# Patient Record
Sex: MALE | Race: WHITE | NOT HISPANIC OR LATINO
[De-identification: names, ages, dates, MRNs, and addresses within clinical notes are randomized per-mention and may not be internally consistent; named-entity substitution may affect disease eponyms.]

---

## 2017-10-18 ENCOUNTER — HOSPITAL (OUTPATIENT)
Dept: OTHER | Age: 22
End: 2017-10-18
Attending: PLASTIC SURGERY

## 2018-07-23 PROBLEM — F31.9 BIPOLAR DISORDER (HCC): Status: ACTIVE | Noted: 2018-07-23

## 2018-07-23 PROBLEM — F41.9 ANXIETY DISORDER, UNSPECIFIED: Status: ACTIVE | Noted: 2018-07-23

## 2018-11-20 ENCOUNTER — EKG ENCOUNTER (OUTPATIENT)
Dept: LAB | Facility: HOSPITAL | Age: 23
End: 2018-11-20
Attending: Other
Payer: COMMERCIAL

## 2018-11-20 ENCOUNTER — APPOINTMENT (OUTPATIENT)
Dept: LAB | Facility: HOSPITAL | Age: 23
End: 2018-11-20
Attending: Other
Payer: COMMERCIAL

## 2018-11-20 DIAGNOSIS — E86.0 DEHYDRATION: Primary | ICD-10-CM

## 2018-11-20 PROCEDURE — 85025 COMPLETE CBC W/AUTO DIFF WBC: CPT

## 2018-11-20 PROCEDURE — 36415 COLL VENOUS BLD VENIPUNCTURE: CPT

## 2018-11-20 PROCEDURE — 93005 ELECTROCARDIOGRAM TRACING: CPT

## 2018-11-20 PROCEDURE — 84443 ASSAY THYROID STIM HORMONE: CPT

## 2018-11-20 PROCEDURE — 82150 ASSAY OF AMYLASE: CPT

## 2018-11-20 PROCEDURE — 84436 ASSAY OF TOTAL THYROXINE: CPT

## 2018-11-20 PROCEDURE — 84100 ASSAY OF PHOSPHORUS: CPT

## 2018-11-20 PROCEDURE — 80053 COMPREHEN METABOLIC PANEL: CPT

## 2018-11-20 PROCEDURE — 93010 ELECTROCARDIOGRAM REPORT: CPT | Performed by: INTERNAL MEDICINE

## 2018-11-20 PROCEDURE — 83735 ASSAY OF MAGNESIUM: CPT

## 2018-11-20 PROCEDURE — 84480 ASSAY TRIIODOTHYRONINE (T3): CPT

## 2019-05-01 PROBLEM — F50.01 ANOREXIA NERVOSA, RESTRICTING TYPE (HCC): Status: ACTIVE | Noted: 2019-05-01

## 2019-05-01 PROBLEM — F50.019 ANOREXIA NERVOSA, RESTRICTING TYPE: Status: ACTIVE | Noted: 2019-05-01

## 2019-05-01 PROBLEM — F50.01 ANOREXIA NERVOSA, RESTRICTING TYPE: Status: ACTIVE | Noted: 2019-05-01

## 2019-05-02 PROBLEM — K83.4 SPHINCTER OF ODDI DYSFUNCTION: Status: ACTIVE | Noted: 2019-05-02

## 2019-05-02 PROBLEM — F50.012 ANOREXIA NERVOSA, RESTRICTING TYPE, SEVERE: Status: ACTIVE | Noted: 2019-05-01

## 2019-05-02 PROBLEM — F50.01: Status: ACTIVE | Noted: 2019-05-01

## 2019-05-02 PROBLEM — F50.01 ANOREXIA NERVOSA, RESTRICTING TYPE, SEVERE: Status: ACTIVE | Noted: 2019-05-01

## 2019-05-02 PROBLEM — K90.0 CELIAC DISEASE (HCC): Status: ACTIVE | Noted: 2019-05-02

## 2019-05-02 PROBLEM — Z78.9 FEMALE-TO-MALE TRANSGENDER PERSON: Status: ACTIVE | Noted: 2019-05-02

## 2019-05-02 PROBLEM — K90.0 CELIAC DISEASE: Status: ACTIVE | Noted: 2019-05-02

## 2019-07-25 ENCOUNTER — HOSPITAL (OUTPATIENT)
Dept: OTHER | Age: 24
End: 2019-07-25
Attending: EMERGENCY MEDICINE

## 2019-07-25 LAB
ABS LYMPH: 1.4 K/CUMM (ref 1–3.5)
ABS MONO: 0.6 K/CUMM (ref 0.1–0.8)
ABS NEUTRO: 3.8 K/CUMM (ref 2–8)
ANION GAP SERPL CALC-SCNC: 12 MEQ/L (ref 10–20)
BASOPHIL: 0 % (ref 0–1)
BUN SERPL-MCNC: 13 MG/DL (ref 6–20)
CALCIUM: 10.1 MG/DL (ref 8.4–10.2)
CHLORIDE: 106 MEQ/L (ref 97–107)
CREATININE: 0.88 MG/DL (ref 0.6–1.3)
DEVICE SN: NORMAL
DIFF_TYPE?: NORMAL
EOSINOPHIL: 1 % (ref 0–6)
GLUCOSE LVL: 97 MG/DL (ref 70–99)
HCT VFR BLD CALC: 45 % (ref 36–51)
HEMOLYSIS 2+: NEGATIVE
HGB BLD-MCNC: 15.4 G/DL (ref 12–17)
IMMATURE GRAN: 0.5 % (ref 0–0.3)
INSTR WBC: 5.9 K/CUMM (ref 4–11)
LYMPHOCYTE: 24 %
MCH RBC QN AUTO: 32 PG (ref 25–35)
MCHC RBC AUTO-ENTMCNC: 34 G/DL (ref 32–37)
MCV RBC AUTO: 93 FL (ref 78–97)
MONOCYTE: 9 %
NEUTROPHIL: 65 %
NRBC BLD MANUAL-RTO: 0 % (ref 0–0.2)
PLATELET: 233 K/CUMM (ref 150–450)
POC_GLU: 94 MG/DL (ref 70–99)
POTASSIUM: 3.9 MEQ/L (ref 3.5–5.1)
RBC # BLD: 4.84 M/CUMM (ref 4.2–6)
RDW: 12 % (ref 11.5–14.5)
SODIUM: 140 MEQ/L (ref 136–145)
TCO2: 26 MEQ/L (ref 19–29)
TECH_ID: NORMAL
WBC # BLD: 5.9 K/CUMM (ref 4–11)

## 2019-09-04 ENCOUNTER — LAB SERVICES (OUTPATIENT)
Dept: LAB | Age: 24
End: 2019-09-04

## 2019-09-04 ENCOUNTER — OFFICE VISIT (OUTPATIENT)
Dept: OBGYN | Age: 24
End: 2019-09-04

## 2019-09-04 VITALS
SYSTOLIC BLOOD PRESSURE: 100 MMHG | WEIGHT: 141 LBS | BODY MASS INDEX: 25.95 KG/M2 | HEIGHT: 62 IN | DIASTOLIC BLOOD PRESSURE: 60 MMHG

## 2019-09-04 DIAGNOSIS — Z01.419 ENCOUNTER FOR ROUTINE GYNECOLOGICAL EXAMINATION WITH PAPANICOLAOU SMEAR OF CERVIX: Primary | ICD-10-CM

## 2019-09-04 DIAGNOSIS — Z11.3 ROUTINE SCREENING FOR STI (SEXUALLY TRANSMITTED INFECTION): ICD-10-CM

## 2019-09-04 DIAGNOSIS — Z11.51 SCREENING FOR HUMAN PAPILLOMAVIRUS (HPV): ICD-10-CM

## 2019-09-04 DIAGNOSIS — Z11.8 ENCOUNTER FOR SCREENING EXAMINATION FOR CHLAMYDIAL INFECTION: ICD-10-CM

## 2019-09-04 DIAGNOSIS — N92.6 IRREGULAR PERIODS: ICD-10-CM

## 2019-09-04 PROBLEM — F31.9 BIPOLAR DISORDER (CMD): Status: ACTIVE | Noted: 2018-07-23

## 2019-09-04 PROBLEM — Z78.9 FEMALE-TO-MALE TRANSGENDER PERSON: Status: ACTIVE | Noted: 2019-05-02

## 2019-09-04 PROBLEM — K83.4 SPHINCTER OF ODDI DYSFUNCTION: Status: ACTIVE | Noted: 2019-05-02

## 2019-09-04 PROBLEM — F50.9 EATING DISORDER: Status: ACTIVE | Noted: 2019-09-04

## 2019-09-04 PROBLEM — F41.9 ANXIETY DISORDER, UNSPECIFIED: Status: ACTIVE | Noted: 2018-07-23

## 2019-09-04 PROBLEM — K90.0 CELIAC DISEASE: Status: ACTIVE | Noted: 2019-05-02

## 2019-09-04 LAB
HBV SURFACE AG SERPL QL IA: NEGATIVE
HIV1+2 AB SERPL QL IA: ABNORMAL

## 2019-09-04 PROCEDURE — 87389 HIV-1 AG W/HIV-1&-2 AB AG IA: CPT | Performed by: NURSE PRACTITIONER

## 2019-09-04 PROCEDURE — 87491 CHLMYD TRACH DNA AMP PROBE: CPT | Performed by: NURSE PRACTITIONER

## 2019-09-04 PROCEDURE — 36415 COLL VENOUS BLD VENIPUNCTURE: CPT | Performed by: NURSE PRACTITIONER

## 2019-09-04 PROCEDURE — 87591 N.GONORRHOEAE DNA AMP PROB: CPT | Performed by: NURSE PRACTITIONER

## 2019-09-04 PROCEDURE — 86803 HEPATITIS C AB TEST: CPT | Performed by: NURSE PRACTITIONER

## 2019-09-04 PROCEDURE — 87340 HEPATITIS B SURFACE AG IA: CPT | Performed by: NURSE PRACTITIONER

## 2019-09-04 PROCEDURE — 99385 PREV VISIT NEW AGE 18-39: CPT | Performed by: NURSE PRACTITIONER

## 2019-09-04 PROCEDURE — 86592 SYPHILIS TEST NON-TREP QUAL: CPT | Performed by: NURSE PRACTITIONER

## 2019-09-04 PROCEDURE — 99203 OFFICE O/P NEW LOW 30 MIN: CPT | Performed by: NURSE PRACTITIONER

## 2019-09-04 PROCEDURE — 88142 CYTOPATH C/V THIN LAYER: CPT | Performed by: PATHOLOGY

## 2019-09-04 RX ORDER — CLONAZEPAM 1 MG/1
1 TABLET ORAL
COMMUNITY
Start: 2019-07-10 | End: 2019-10-08

## 2019-09-04 RX ORDER — LAMOTRIGINE 150 MG/1
300 TABLET ORAL
COMMUNITY
Start: 2019-08-21 | End: 2019-11-19

## 2019-09-04 RX ORDER — ASENAPINE 10 MG/1
10 TABLET SUBLINGUAL
COMMUNITY
Start: 2019-08-21 | End: 2019-09-20

## 2019-09-04 SDOH — HEALTH STABILITY: MENTAL HEALTH: HOW OFTEN DO YOU HAVE A DRINK CONTAINING ALCOHOL?: NEVER

## 2019-09-05 DIAGNOSIS — R75 INDETERMINATE HIV RESULT: Primary | ICD-10-CM

## 2019-09-05 LAB
C TRACH DNA SPEC QL NAA+PROBE: NEGATIVE
HCV AB SER QL: NEGATIVE
HIV 1+2 AB+HIV1 P24 AG SERPL QL IA: NONREACTIVE
N GONORRHOEA DNA SPEC QL NAA+PROBE: NEGATIVE

## 2019-09-08 LAB — RPR SER QL: NORMAL

## 2019-09-09 LAB — AP REPORT: NORMAL

## 2019-09-17 ENCOUNTER — OFFICE VISIT (OUTPATIENT)
Dept: OBGYN | Age: 24
End: 2019-09-17

## 2019-09-17 ENCOUNTER — IMAGING SERVICES (OUTPATIENT)
Dept: ULTRASOUND IMAGING | Age: 24
End: 2019-09-17
Attending: NURSE PRACTITIONER

## 2019-09-17 VITALS
SYSTOLIC BLOOD PRESSURE: 102 MMHG | BODY MASS INDEX: 26.57 KG/M2 | WEIGHT: 145.25 LBS | DIASTOLIC BLOOD PRESSURE: 82 MMHG

## 2019-09-17 DIAGNOSIS — N92.6 IRREGULAR PERIODS: ICD-10-CM

## 2019-09-17 DIAGNOSIS — N92.6 IRREGULAR MENSES: Primary | ICD-10-CM

## 2019-09-17 PROCEDURE — 99214 OFFICE O/P EST MOD 30 MIN: CPT | Performed by: OBSTETRICS & GYNECOLOGY

## 2019-09-17 PROCEDURE — 76856 US EXAM PELVIC COMPLETE: CPT | Performed by: RADIOLOGY

## 2019-09-17 PROCEDURE — 76830 TRANSVAGINAL US NON-OB: CPT | Performed by: RADIOLOGY

## 2019-09-17 RX ORDER — TESTOSTERONE CYPIONATE 200 MG/ML
INJECTION, SOLUTION INTRAMUSCULAR
Refills: 0 | COMMUNITY
Start: 2019-08-27

## 2019-09-17 RX ORDER — TEMAZEPAM 15 MG/1
15 CAPSULE ORAL
COMMUNITY
Start: 2019-09-04 | End: 2019-10-04

## 2019-09-17 RX ORDER — TEMAZEPAM 15 MG/1
CAPSULE ORAL
Refills: 0 | COMMUNITY
Start: 2019-09-04 | End: 2021-01-24 | Stop reason: ALTCHOICE

## 2019-09-18 ENCOUNTER — WORKER'S COMP (OUTPATIENT)
Dept: OCCUPATIONAL MEDICINE | Age: 24
End: 2019-09-18

## 2019-09-18 DIAGNOSIS — S61.011A LACERATION OF RIGHT THUMB WITHOUT FOREIGN BODY WITHOUT DAMAGE TO NAIL, INITIAL ENCOUNTER: ICD-10-CM

## 2019-09-18 DIAGNOSIS — Z02.71 ISSUE OF MEDICAL CERTIFICATE FOR DISABILITY EXAMINATION: Primary | ICD-10-CM

## 2019-09-18 PROCEDURE — 99203 OFFICE O/P NEW LOW 30 MIN: CPT | Performed by: FAMILY MEDICINE

## 2019-09-18 PROCEDURE — 90714 TD VACC NO PRESV 7 YRS+ IM: CPT

## 2019-09-18 PROCEDURE — 90471 IMMUNIZATION ADMIN: CPT

## 2019-09-19 ENCOUNTER — TELEPHONE (OUTPATIENT)
Dept: OBGYN | Age: 24
End: 2019-09-19

## 2019-09-19 DIAGNOSIS — R10.2 PELVIC PAIN: Primary | ICD-10-CM

## 2019-10-12 ENCOUNTER — WALK IN (OUTPATIENT)
Dept: URGENT CARE | Age: 24
End: 2019-10-12

## 2019-10-12 VITALS
HEART RATE: 74 BPM | OXYGEN SATURATION: 98 % | RESPIRATION RATE: 16 BRPM | DIASTOLIC BLOOD PRESSURE: 70 MMHG | SYSTOLIC BLOOD PRESSURE: 118 MMHG | TEMPERATURE: 98.2 F

## 2019-10-12 DIAGNOSIS — J40 BRONCHITIS: ICD-10-CM

## 2019-10-12 DIAGNOSIS — J32.9 SINUSITIS, UNSPECIFIED CHRONICITY, UNSPECIFIED LOCATION: Primary | ICD-10-CM

## 2019-10-12 PROCEDURE — 99204 OFFICE O/P NEW MOD 45 MIN: CPT | Performed by: FAMILY MEDICINE

## 2019-10-12 RX ORDER — ASENAPINE MALEATE 10 MG/1
TABLET SUBLINGUAL
Refills: 0 | COMMUNITY
Start: 2019-09-20 | End: 2021-01-24 | Stop reason: ALTCHOICE

## 2019-10-12 RX ORDER — PREDNISONE 50 MG/1
50 TABLET ORAL DAILY
Qty: 5 TABLET | Refills: 0 | Status: SHIPPED | OUTPATIENT
Start: 2019-10-12 | End: 2019-10-17

## 2019-10-12 RX ORDER — ALBUTEROL SULFATE 90 UG/1
AEROSOL, METERED RESPIRATORY (INHALATION)
Qty: 1 INHALER | Refills: 0 | Status: SHIPPED | OUTPATIENT
Start: 2019-10-12 | End: 2021-01-24 | Stop reason: ALTCHOICE

## 2019-10-12 RX ORDER — AMOXICILLIN 875 MG/1
875 TABLET, COATED ORAL 2 TIMES DAILY
Qty: 20 TABLET | Refills: 0 | Status: SHIPPED | OUTPATIENT
Start: 2019-10-12 | End: 2019-10-22

## 2019-10-12 ASSESSMENT — ENCOUNTER SYMPTOMS
COUGH: 1
SORE THROAT: 1

## 2019-12-03 ENCOUNTER — OFFICE VISIT (OUTPATIENT)
Dept: OBGYN | Age: 24
End: 2019-12-03

## 2019-12-03 VITALS
WEIGHT: 135 LBS | BODY MASS INDEX: 24.84 KG/M2 | DIASTOLIC BLOOD PRESSURE: 72 MMHG | SYSTOLIC BLOOD PRESSURE: 118 MMHG | HEIGHT: 62 IN

## 2019-12-03 DIAGNOSIS — R10.2 PELVIC PAIN: Primary | ICD-10-CM

## 2019-12-03 PROCEDURE — 99213 OFFICE O/P EST LOW 20 MIN: CPT | Performed by: OBSTETRICS & GYNECOLOGY

## 2019-12-03 RX ORDER — LAMOTRIGINE 150 MG/1
300 TABLET ORAL
COMMUNITY
Start: 2019-10-30 | End: 2020-01-28

## 2019-12-03 RX ORDER — NICOTINE 21 MG/24HR
PATCH, TRANSDERMAL 24 HOURS TRANSDERMAL
Refills: 2 | COMMUNITY
Start: 2019-09-03 | End: 2022-10-05 | Stop reason: ALTCHOICE

## 2019-12-03 RX ORDER — CLONAZEPAM 1 MG/1
TABLET ORAL
Refills: 2 | COMMUNITY
Start: 2019-11-22 | End: 2021-01-24 | Stop reason: ALTCHOICE

## 2019-12-03 RX ORDER — BUPROPION HYDROCHLORIDE 75 MG/1
75 TABLET ORAL
COMMUNITY
Start: 2019-10-30 | End: 2020-01-28

## 2019-12-08 ENCOUNTER — WORKER'S COMP (OUTPATIENT)
Dept: URGENT CARE | Age: 24
End: 2019-12-08

## 2019-12-08 DIAGNOSIS — M25.511 ACUTE PAIN OF RIGHT SHOULDER: ICD-10-CM

## 2019-12-08 PROCEDURE — A4565 SLINGS: HCPCS | Performed by: FAMILY MEDICINE

## 2019-12-08 PROCEDURE — 99214 OFFICE O/P EST MOD 30 MIN: CPT | Performed by: FAMILY MEDICINE

## 2019-12-08 RX ORDER — TRAZODONE HYDROCHLORIDE 50 MG/1
TABLET ORAL
Refills: 0 | COMMUNITY
Start: 2019-12-04 | End: 2021-01-24 | Stop reason: ALTCHOICE

## 2019-12-08 RX ORDER — NALTREXONE HYDROCHLORIDE 50 MG/1
TABLET, FILM COATED ORAL
Refills: 0 | COMMUNITY
Start: 2019-10-30 | End: 2021-01-24 | Stop reason: ALTCHOICE

## 2019-12-08 RX ORDER — TRAMADOL HYDROCHLORIDE 50 MG/1
50 TABLET ORAL EVERY 6 HOURS PRN
Qty: 20 TABLET | Refills: 0 | Status: SHIPPED | OUTPATIENT
Start: 2019-12-08 | End: 2019-12-08 | Stop reason: CLARIF

## 2019-12-08 ASSESSMENT — PAIN SCALES - GENERAL: PAINLEVEL: 7-8

## 2019-12-09 ENCOUNTER — TELEPHONE (OUTPATIENT)
Dept: OBGYN | Age: 24
End: 2019-12-09

## 2019-12-09 ENCOUNTER — IMAGING SERVICES (OUTPATIENT)
Dept: GENERAL RADIOLOGY | Age: 24
End: 2019-12-09
Attending: PHYSICIAN ASSISTANT

## 2019-12-09 ENCOUNTER — WORKER'S COMP (OUTPATIENT)
Dept: OCCUPATIONAL MEDICINE | Age: 24
End: 2019-12-09

## 2019-12-09 DIAGNOSIS — S46.911A STRAIN OF RIGHT SHOULDER, INITIAL ENCOUNTER: Primary | ICD-10-CM

## 2019-12-09 DIAGNOSIS — Z02.71 ISSUE OF MEDICAL CERTIFICATE FOR DISABILITY EXAMINATION: ICD-10-CM

## 2019-12-09 DIAGNOSIS — S46.911A STRAIN OF RIGHT SHOULDER, INITIAL ENCOUNTER: ICD-10-CM

## 2019-12-09 PROCEDURE — 99213 OFFICE O/P EST LOW 20 MIN: CPT | Performed by: PHYSICIAN ASSISTANT

## 2019-12-09 PROCEDURE — 73030 X-RAY EXAM OF SHOULDER: CPT | Performed by: RADIOLOGY

## 2019-12-11 ENCOUNTER — MED INFO FORMS (OUTPATIENT)
Dept: HEALTH INFORMATION MANAGEMENT | Facility: OTHER | Age: 24
End: 2019-12-11

## 2019-12-16 ENCOUNTER — TELEPHONE (OUTPATIENT)
Dept: OBGYN | Age: 24
End: 2019-12-16

## 2019-12-16 ENCOUNTER — EXTERNAL RECORD (OUTPATIENT)
Dept: HEALTH INFORMATION MANAGEMENT | Facility: OTHER | Age: 24
End: 2019-12-16

## 2019-12-16 PROCEDURE — 58571 TLH W/T/O 250 G OR LESS: CPT | Performed by: OBSTETRICS & GYNECOLOGY

## 2019-12-16 PROCEDURE — 58100 BIOPSY OF UTERUS LINING: CPT | Performed by: OBSTETRICS & GYNECOLOGY

## 2019-12-16 RX ORDER — PROMETHAZINE HYDROCHLORIDE 25 MG/1
25 TABLET ORAL EVERY 6 HOURS PRN
Qty: 30 TABLET | Refills: 0 | Status: SHIPPED | OUTPATIENT
Start: 2019-12-16 | End: 2021-01-24 | Stop reason: ALTCHOICE

## 2019-12-17 ENCOUNTER — TELEPHONE (OUTPATIENT)
Dept: OBGYN | Age: 24
End: 2019-12-17

## 2019-12-23 ENCOUNTER — WORKER'S COMP (OUTPATIENT)
Dept: OCCUPATIONAL MEDICINE | Age: 24
End: 2019-12-23

## 2019-12-23 VITALS — SYSTOLIC BLOOD PRESSURE: 126 MMHG | HEART RATE: 125 BPM | TEMPERATURE: 98.4 F | DIASTOLIC BLOOD PRESSURE: 84 MMHG

## 2019-12-23 DIAGNOSIS — Z02.71 ISSUE OF MEDICAL CERTIFICATE FOR DISABILITY EXAMINATION: Primary | ICD-10-CM

## 2019-12-23 DIAGNOSIS — S46.911D STRAIN OF RIGHT SHOULDER, SUBSEQUENT ENCOUNTER: ICD-10-CM

## 2019-12-23 PROCEDURE — 99213 OFFICE O/P EST LOW 20 MIN: CPT | Performed by: FAMILY MEDICINE

## 2019-12-27 ENCOUNTER — TELEPHONE (OUTPATIENT)
Dept: OBGYN | Age: 24
End: 2019-12-27

## 2019-12-31 ENCOUNTER — OFFICE VISIT (OUTPATIENT)
Dept: OBGYN | Age: 24
End: 2019-12-31

## 2019-12-31 VITALS
WEIGHT: 134 LBS | SYSTOLIC BLOOD PRESSURE: 120 MMHG | DIASTOLIC BLOOD PRESSURE: 84 MMHG | BODY MASS INDEX: 24.51 KG/M2 | TEMPERATURE: 98.1 F

## 2019-12-31 DIAGNOSIS — R10.2 PELVIC PAIN: Primary | ICD-10-CM

## 2019-12-31 PROCEDURE — 99024 POSTOP FOLLOW-UP VISIT: CPT | Performed by: OBSTETRICS & GYNECOLOGY

## 2019-12-31 RX ORDER — CEPHALEXIN 500 MG/1
500 CAPSULE ORAL 2 TIMES DAILY
Qty: 14 CAPSULE | Refills: 0 | Status: SHIPPED | OUTPATIENT
Start: 2019-12-31 | End: 2020-01-02 | Stop reason: SDUPTHER

## 2019-12-31 RX ORDER — TRAMADOL HYDROCHLORIDE 50 MG/1
TABLET ORAL
Refills: 0 | COMMUNITY
Start: 2019-12-17 | End: 2020-01-02 | Stop reason: SDUPTHER

## 2020-01-02 ENCOUNTER — OFFICE VISIT (OUTPATIENT)
Dept: OBGYN | Age: 25
End: 2020-01-02

## 2020-01-02 ENCOUNTER — APPOINTMENT (OUTPATIENT)
Dept: OBGYN | Age: 25
End: 2020-01-02

## 2020-01-02 ENCOUNTER — TELEPHONE (OUTPATIENT)
Dept: OBGYN | Age: 25
End: 2020-01-02

## 2020-01-02 VITALS
DIASTOLIC BLOOD PRESSURE: 66 MMHG | SYSTOLIC BLOOD PRESSURE: 124 MMHG | WEIGHT: 132.25 LBS | TEMPERATURE: 100.1 F | BODY MASS INDEX: 24.19 KG/M2

## 2020-01-02 DIAGNOSIS — T81.40XD POSTOPERATIVE INFECTION, UNSPECIFIED TYPE, SUBSEQUENT ENCOUNTER: Primary | ICD-10-CM

## 2020-01-02 PROCEDURE — 99024 POSTOP FOLLOW-UP VISIT: CPT | Performed by: OBSTETRICS & GYNECOLOGY

## 2020-01-02 RX ORDER — TRAMADOL HYDROCHLORIDE 50 MG/1
50 TABLET ORAL EVERY 6 HOURS PRN
Qty: 30 TABLET | Refills: 0 | Status: SHIPPED | OUTPATIENT
Start: 2020-01-02 | End: 2021-01-24 | Stop reason: ALTCHOICE

## 2020-01-02 RX ORDER — CEPHALEXIN 500 MG/1
500 CAPSULE ORAL 4 TIMES DAILY
Qty: 28 CAPSULE | Refills: 0 | Status: SHIPPED | OUTPATIENT
Start: 2020-01-02 | End: 2020-01-09

## 2020-01-09 ENCOUNTER — TELEPHONE (OUTPATIENT)
Dept: OBGYN | Age: 25
End: 2020-01-09

## 2020-01-15 ENCOUNTER — OFFICE VISIT (OUTPATIENT)
Dept: OBGYN | Age: 25
End: 2020-01-15

## 2020-01-15 VITALS
WEIGHT: 133.25 LBS | SYSTOLIC BLOOD PRESSURE: 114 MMHG | BODY MASS INDEX: 24.37 KG/M2 | DIASTOLIC BLOOD PRESSURE: 80 MMHG

## 2020-01-15 DIAGNOSIS — R10.2 PELVIC PAIN: Primary | ICD-10-CM

## 2020-01-15 PROCEDURE — 99024 POSTOP FOLLOW-UP VISIT: CPT | Performed by: OBSTETRICS & GYNECOLOGY

## 2020-01-30 ENCOUNTER — OFFICE VISIT (OUTPATIENT)
Dept: OBGYN | Age: 25
End: 2020-01-30

## 2020-01-30 VITALS
BODY MASS INDEX: 24.42 KG/M2 | TEMPERATURE: 98.7 F | DIASTOLIC BLOOD PRESSURE: 86 MMHG | SYSTOLIC BLOOD PRESSURE: 132 MMHG | WEIGHT: 133.5 LBS

## 2020-01-30 DIAGNOSIS — R10.2 PELVIC PAIN: Primary | ICD-10-CM

## 2020-01-30 PROCEDURE — 99024 POSTOP FOLLOW-UP VISIT: CPT | Performed by: OBSTETRICS & GYNECOLOGY

## 2020-02-14 ENCOUNTER — WALK IN (OUTPATIENT)
Dept: URGENT CARE | Age: 25
End: 2020-02-14

## 2020-02-14 VITALS
TEMPERATURE: 98.5 F | DIASTOLIC BLOOD PRESSURE: 70 MMHG | RESPIRATION RATE: 20 BRPM | OXYGEN SATURATION: 98 % | SYSTOLIC BLOOD PRESSURE: 118 MMHG | HEART RATE: 110 BPM

## 2020-02-14 DIAGNOSIS — R50.9 FEVER, UNSPECIFIED FEVER CAUSE: ICD-10-CM

## 2020-02-14 DIAGNOSIS — R11.0 NAUSEA: Primary | ICD-10-CM

## 2020-02-14 DIAGNOSIS — K52.9 GASTROENTERITIS: ICD-10-CM

## 2020-02-14 LAB
FLUAV AG UPPER RESP QL IA.RAPID: NEGATIVE
FLUBV AG UPPER RESP QL IA.RAPID: NEGATIVE

## 2020-02-14 PROCEDURE — 87804 INFLUENZA ASSAY W/OPTIC: CPT | Performed by: FAMILY MEDICINE

## 2020-02-14 PROCEDURE — 99214 OFFICE O/P EST MOD 30 MIN: CPT | Performed by: FAMILY MEDICINE

## 2020-02-14 RX ORDER — BUPROPION HYDROCHLORIDE 75 MG/1
75 TABLET ORAL
COMMUNITY
Start: 2020-01-15 | End: 2020-04-14

## 2020-02-14 RX ORDER — DIAZEPAM 5 MG/1
TABLET ORAL
Refills: 0 | COMMUNITY
Start: 2020-01-15 | End: 2022-10-05 | Stop reason: ALTCHOICE

## 2020-02-14 RX ORDER — DIPHENOXYLATE HYDROCHLORIDE AND ATROPINE SULFATE 2.5; .025 MG/1; MG/1
1 TABLET ORAL 4 TIMES DAILY PRN
Qty: 20 TABLET | Refills: 0 | Status: SHIPPED | OUTPATIENT
Start: 2020-02-14 | End: 2021-01-24 | Stop reason: ALTCHOICE

## 2020-02-14 RX ORDER — LAMOTRIGINE 150 MG/1
300 TABLET ORAL
COMMUNITY
Start: 2020-01-15 | End: 2020-04-14

## 2020-02-14 RX ORDER — ONDANSETRON HYDROCHLORIDE 8 MG/1
8 TABLET, FILM COATED ORAL EVERY 8 HOURS PRN
Qty: 10 TABLET | Refills: 0 | Status: SHIPPED | OUTPATIENT
Start: 2020-02-14 | End: 2020-02-19

## 2020-03-20 ENCOUNTER — HOSPITAL ENCOUNTER (EMERGENCY)
Facility: HOSPITAL | Age: 25
Discharge: HOME | End: 2020-03-20
Attending: EMERGENCY MEDICINE

## 2020-03-20 VITALS
OXYGEN SATURATION: 99 % | BODY MASS INDEX: 24.84 KG/M2 | HEIGHT: 62 IN | TEMPERATURE: 99 F | WEIGHT: 135 LBS | RESPIRATION RATE: 15 BRPM | HEART RATE: 107 BPM

## 2020-03-20 DIAGNOSIS — G40.909 SEIZURE DISORDER (HCC): ICD-10-CM

## 2020-03-20 DIAGNOSIS — J06.9 VIRAL UPPER RESPIRATORY INFECTION: Primary | ICD-10-CM

## 2020-03-20 LAB
ALBUMIN SERPL-MCNC: 4 G/DL (ref 3.4–5)
ALBUMIN/GLOB SERPL: 1.4 {RATIO} (ref 1–2)
ALP LIVER SERPL-CCNC: 56 U/L (ref 45–117)
ALT SERPL-CCNC: 23 U/L (ref 16–61)
ANION GAP SERPL CALC-SCNC: 4 MMOL/L (ref 0–18)
AST SERPL-CCNC: 21 U/L (ref 15–37)
BASOPHILS # BLD AUTO: 0.04 X10(3) UL (ref 0–0.2)
BASOPHILS NFR BLD AUTO: 1 %
BILIRUB SERPL-MCNC: 0.8 MG/DL (ref 0.1–2)
BUN BLD-MCNC: 10 MG/DL (ref 7–18)
BUN/CREAT SERPL: 11.4 (ref 10–20)
CALCIUM BLD-MCNC: 9.2 MG/DL (ref 8.5–10.1)
CHLORIDE SERPL-SCNC: 111 MMOL/L (ref 98–112)
CO2 SERPL-SCNC: 25 MMOL/L (ref 21–32)
CREAT BLD-MCNC: 0.88 MG/DL (ref 0.7–1.3)
DEPRECATED RDW RBC AUTO: 37.7 FL (ref 35.1–46.3)
EOSINOPHIL # BLD AUTO: 0.08 X10(3) UL (ref 0–0.7)
EOSINOPHIL NFR BLD AUTO: 2 %
ERYTHROCYTE [DISTWIDTH] IN BLOOD BY AUTOMATED COUNT: 11.7 % (ref 11–15)
FLUAV + FLUBV RNA SPEC NAA+PROBE: NEGATIVE
GLOBULIN PLAS-MCNC: 2.9 G/DL (ref 2.8–4.4)
GLUCOSE BLD-MCNC: 127 MG/DL (ref 70–99)
GLUCOSE BLD-MCNC: 90 MG/DL (ref 70–99)
HCT VFR BLD AUTO: 44.3 % (ref 39–53)
HGB BLD-MCNC: 15.2 G/DL (ref 13–17.5)
IMM GRANULOCYTES # BLD AUTO: 0.01 X10(3) UL (ref 0–1)
IMM GRANULOCYTES NFR BLD: 0.2 %
LYMPHOCYTES # BLD AUTO: 1.76 X10(3) UL (ref 1–4)
LYMPHOCYTES NFR BLD AUTO: 43.1 %
M PROTEIN MFR SERPL ELPH: 6.9 G/DL (ref 6.4–8.2)
MCH RBC QN AUTO: 30.4 PG (ref 26–34)
MCHC RBC AUTO-ENTMCNC: 34.3 G/DL (ref 31–37)
MCV RBC AUTO: 88.6 FL (ref 80–100)
MONOCYTES # BLD AUTO: 0.37 X10(3) UL (ref 0.1–1)
MONOCYTES NFR BLD AUTO: 9.1 %
NEUTROPHILS # BLD AUTO: 1.82 X10 (3) UL (ref 1.5–7.7)
NEUTROPHILS # BLD AUTO: 1.82 X10(3) UL (ref 1.5–7.7)
NEUTROPHILS NFR BLD AUTO: 44.6 %
OSMOLALITY SERPL CALC.SUM OF ELEC: 289 MOSM/KG (ref 275–295)
PLATELET # BLD AUTO: 231 10(3)UL (ref 150–450)
POTASSIUM SERPL-SCNC: 3.5 MMOL/L (ref 3.5–5.1)
RBC # BLD AUTO: 5 X10(6)UL (ref 4.3–5.7)
SODIUM SERPL-SCNC: 140 MMOL/L (ref 136–145)
WBC # BLD AUTO: 4.1 X10(3) UL (ref 4–11)

## 2020-03-20 PROCEDURE — 87999 UNLISTED MICROBIOLOGY PX: CPT

## 2020-03-20 PROCEDURE — 93005 ELECTROCARDIOGRAM TRACING: CPT

## 2020-03-20 PROCEDURE — 96366 THER/PROPH/DIAG IV INF ADDON: CPT

## 2020-03-20 PROCEDURE — 99284 EMERGENCY DEPT VISIT MOD MDM: CPT

## 2020-03-20 PROCEDURE — 96365 THER/PROPH/DIAG IV INF INIT: CPT

## 2020-03-20 PROCEDURE — 93010 ELECTROCARDIOGRAM REPORT: CPT

## 2020-03-20 PROCEDURE — 87798 DETECT AGENT NOS DNA AMP: CPT | Performed by: EMERGENCY MEDICINE

## 2020-03-20 PROCEDURE — 87502 INFLUENZA DNA AMP PROBE: CPT | Performed by: EMERGENCY MEDICINE

## 2020-03-20 PROCEDURE — 80053 COMPREHEN METABOLIC PANEL: CPT | Performed by: EMERGENCY MEDICINE

## 2020-03-20 PROCEDURE — 85025 COMPLETE CBC W/AUTO DIFF WBC: CPT | Performed by: EMERGENCY MEDICINE

## 2020-03-20 PROCEDURE — 82962 GLUCOSE BLOOD TEST: CPT

## 2020-03-20 RX ORDER — LAMOTRIGINE 200 MG/1
100 TABLET ORAL 2 TIMES DAILY
Qty: 60 TABLET | Refills: 0 | Status: SHIPPED | OUTPATIENT
Start: 2020-03-20 | End: 2020-03-20

## 2020-03-20 RX ORDER — LAMOTRIGINE 150 MG/1
150 TABLET ORAL 2 TIMES DAILY
Qty: 60 TABLET | Refills: 0 | Status: SHIPPED | OUTPATIENT
Start: 2020-03-20 | End: 2020-04-19

## 2020-03-20 NOTE — ED INITIAL ASSESSMENT (HPI)
Pt here after having witnessed seizure at work. Pt does not remember event. Pt notes running out medications and not taking Lamictal. Pt notes that he has also had cough and intermittent chills for last dew days.  Pt notes that his head hearts but unsure it

## 2020-03-20 NOTE — ED PROVIDER NOTES
Patient Seen in: [facility] Emergency Department      History   Patient presents with:  Seizure Disorder  Cough/URI    Stated Complaint: seizure/cough    HPI    28-year-old male presents emergency department who states after having witnessed seizure 5 days/week             Review of Systems    Positive for stated complaint: seizure/cough  Other systems are as noted in HPI. Constitutional and vital signs reviewed. All other systems reviewed and negative except as noted above.     Physical Exam view results for these tests on the individual orders. CBC W/ DIFFERENTIAL     EKG    Rate, intervals and axes as noted on EKG Report.   Rate: 109  Rhythm: Sinus Rhythm  Reading: Sinus tachycardia              Patient was evaluated and will be given a Yvan

## 2020-03-21 LAB
ATRIAL RATE: 109 BPM
P AXIS: 59 DEGREES
P-R INTERVAL: 144 MS
Q-T INTERVAL: 336 MS
QRS DURATION: 68 MS
QTC CALCULATION (BEZET): 452 MS
R AXIS: 72 DEGREES
T AXIS: 49 DEGREES
VENTRICULAR RATE: 109 BPM

## 2021-01-24 ENCOUNTER — WALK IN (OUTPATIENT)
Dept: URGENT CARE | Age: 26
End: 2021-01-24

## 2021-01-24 VITALS
DIASTOLIC BLOOD PRESSURE: 66 MMHG | HEART RATE: 86 BPM | SYSTOLIC BLOOD PRESSURE: 118 MMHG | OXYGEN SATURATION: 100 % | RESPIRATION RATE: 18 BRPM | TEMPERATURE: 98.8 F

## 2021-01-24 DIAGNOSIS — N39.0 URINARY TRACT INFECTION WITHOUT HEMATURIA, SITE UNSPECIFIED: ICD-10-CM

## 2021-01-24 DIAGNOSIS — Z20.822 SUSPECTED COVID-19 VIRUS INFECTION: Primary | ICD-10-CM

## 2021-01-24 DIAGNOSIS — R30.0 DYSURIA: ICD-10-CM

## 2021-01-24 DIAGNOSIS — Z20.822 CONTACT WITH AND (SUSPECTED) EXPOSURE TO COVID-19: ICD-10-CM

## 2021-01-24 LAB
BILIRUBIN URINE: NEGATIVE
BLOOD URINE: NEGATIVE
CLARITY: CLEAR
COLOR: NORMAL
GLUCOSE QUALITATIVE U: NEGATIVE
KETONES, URINE: NEGATIVE
LEUKOCYTE ESTERASE URINE: NEGATIVE
NITRITE URINE: NEGATIVE
PH URINE: 7 (ref 5–7)
SARS-COV-2 AG RESP QL IA.RAPID: NOT DETECTED
SPECIFIC GRAVITY URINE: 1 (ref 1–1.03)
URINE PROTEIN, QUAL (DIPSTICK): NEGATIVE
UROBILINOGEN URINE: <2

## 2021-01-24 PROCEDURE — 81003 URINALYSIS AUTO W/O SCOPE: CPT | Performed by: FAMILY MEDICINE

## 2021-01-24 PROCEDURE — 87086 URINE CULTURE/COLONY COUNT: CPT | Performed by: FAMILY MEDICINE

## 2021-01-24 PROCEDURE — 87426 SARSCOV CORONAVIRUS AG IA: CPT | Performed by: FAMILY MEDICINE

## 2021-01-24 PROCEDURE — 99214 OFFICE O/P EST MOD 30 MIN: CPT | Performed by: FAMILY MEDICINE

## 2021-01-24 RX ORDER — CEFUROXIME AXETIL 500 MG/1
500 TABLET ORAL 2 TIMES DAILY
Qty: 20 TABLET | Refills: 0 | Status: SHIPPED | OUTPATIENT
Start: 2021-01-24 | End: 2021-02-03

## 2021-01-24 RX ORDER — ARIPIPRAZOLE 5 MG/1
TABLET ORAL
COMMUNITY
Start: 2020-11-25 | End: 2022-10-05 | Stop reason: ALTCHOICE

## 2021-01-24 RX ORDER — LAMOTRIGINE 150 MG/1
150 TABLET ORAL
COMMUNITY
Start: 2020-07-20

## 2021-01-24 ASSESSMENT — ENCOUNTER SYMPTOMS
COUGH: 1
SORE THROAT: 1
HEADACHES: 1

## 2021-01-25 LAB — FINAL REPORT: NORMAL

## 2021-04-08 ENCOUNTER — WALK IN (OUTPATIENT)
Dept: URGENT CARE | Age: 26
End: 2021-04-08

## 2021-04-08 DIAGNOSIS — B34.9 VIRAL SYNDROME: Primary | ICD-10-CM

## 2021-04-08 DIAGNOSIS — J02.9 SORE THROAT: ICD-10-CM

## 2021-04-08 DIAGNOSIS — Z20.822 SUSPECTED COVID-19 VIRUS INFECTION: ICD-10-CM

## 2021-04-08 LAB
INTERNAL PROCEDURAL CONTROLS ACCEPTABLE: YES
S PYO AG THROAT QL IA.RAPID: NEGATIVE
SARS-COV+SARS-COV-2 AG RESP QL IA.RAPID: NOT DETECTED

## 2021-04-08 PROCEDURE — 87880 STREP A ASSAY W/OPTIC: CPT | Performed by: FAMILY MEDICINE

## 2021-04-08 PROCEDURE — 87426 SARSCOV CORONAVIRUS AG IA: CPT | Performed by: FAMILY MEDICINE

## 2021-04-08 PROCEDURE — 99213 OFFICE O/P EST LOW 20 MIN: CPT | Performed by: FAMILY MEDICINE

## 2021-04-08 SDOH — HEALTH STABILITY: MENTAL HEALTH: HOW OFTEN DO YOU HAVE A DRINK CONTAINING ALCOHOL?: NEVER

## 2021-04-08 ASSESSMENT — PAIN SCALES - GENERAL: PAINLEVEL: 5-6

## 2021-05-26 VITALS
DIASTOLIC BLOOD PRESSURE: 70 MMHG | SYSTOLIC BLOOD PRESSURE: 110 MMHG | SYSTOLIC BLOOD PRESSURE: 110 MMHG | HEART RATE: 85 BPM | OXYGEN SATURATION: 100 % | RESPIRATION RATE: 16 BRPM | HEART RATE: 77 BPM | DIASTOLIC BLOOD PRESSURE: 76 MMHG | TEMPERATURE: 98.5 F | TEMPERATURE: 98.5 F | OXYGEN SATURATION: 98 % | RESPIRATION RATE: 18 BRPM

## 2021-06-27 ENCOUNTER — IMAGING SERVICES (OUTPATIENT)
Dept: GENERAL RADIOLOGY | Age: 26
End: 2021-06-27
Attending: FAMILY MEDICINE

## 2021-06-27 ENCOUNTER — WALK IN (OUTPATIENT)
Dept: URGENT CARE | Age: 26
End: 2021-06-27

## 2021-06-27 VITALS
SYSTOLIC BLOOD PRESSURE: 110 MMHG | OXYGEN SATURATION: 100 % | HEART RATE: 80 BPM | RESPIRATION RATE: 18 BRPM | DIASTOLIC BLOOD PRESSURE: 76 MMHG | TEMPERATURE: 97.9 F

## 2021-06-27 DIAGNOSIS — S20.229A CONTUSION OF BACK, UNSPECIFIED LATERALITY, INITIAL ENCOUNTER: ICD-10-CM

## 2021-06-27 DIAGNOSIS — S20.229A CONTUSION OF BACK, UNSPECIFIED LATERALITY, INITIAL ENCOUNTER: Primary | ICD-10-CM
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
## 2021-06-27 DIAGNOSIS — S39.92XA INJURY OF LOW BACK, INITIAL ENCOUNTER: ICD-10-CM

## 2021-06-27 LAB
APPEARANCE, POC: CLEAR
BILIRUBIN, POC: NEGATIVE
COLOR, POC: YELLOW
GLUCOSE UR-MCNC: NEGATIVE MG/DL
KETONES, POC: NEGATIVE MG/DL
NITRITE, POC: NEGATIVE
OCCULT BLOOD, POC: NEGATIVE
PH UR: 7.5 [PH] (ref 5–7)
PROT UR-MCNC: NEGATIVE MG/DL
SP GR UR: 1.02 (ref 1–1.03)
UROBILINOGEN UR-MCNC: 0.2 MG/DL (ref 0–1)
WBC (LEUKOCYTE) ESTERASE, POC: ABNORMAL

## 2021-06-27 PROCEDURE — 81002 URINALYSIS NONAUTO W/O SCOPE: CPT | Performed by: FAMILY MEDICINE

## 2021-06-27 PROCEDURE — 87186 SC STD MICRODIL/AGAR DIL: CPT | Performed by: FAMILY MEDICINE

## 2021-06-27 PROCEDURE — 87088 URINE BACTERIA CULTURE: CPT | Performed by: FAMILY MEDICINE

## 2021-06-27 PROCEDURE — 72110 X-RAY EXAM L-2 SPINE 4/>VWS: CPT | Performed by: RADIOLOGY

## 2021-06-27 PROCEDURE — 81015 MICROSCOPIC EXAM OF URINE: CPT | Performed by: FAMILY MEDICINE

## 2021-06-27 PROCEDURE — 87086 URINE CULTURE/COLONY COUNT: CPT | Performed by: FAMILY MEDICINE

## 2021-06-27 PROCEDURE — 99214 OFFICE O/P EST MOD 30 MIN: CPT | Performed by: FAMILY MEDICINE

## 2021-06-27 RX ORDER — NABUMETONE 500 MG/1
TABLET, FILM COATED ORAL
Qty: 30 TABLET | Refills: 0 | Status: SHIPPED | OUTPATIENT
Start: 2021-06-27

## 2021-06-27 RX ORDER — CYCLOBENZAPRINE HCL 10 MG
TABLET ORAL
Qty: 15 TABLET | Refills: 0 | Status: SHIPPED | OUTPATIENT
Start: 2021-06-27

## 2021-06-27 ASSESSMENT — PAIN SCALES - GENERAL: PAINLEVEL: 0

## 2021-06-28 LAB
RED BLOOD CELLS URINE: NORMAL (ref 0–3)
SQUAMOUS EPITHELIAL CELLS: NORMAL
WHITE BLOOD CELLS URINE: NORMAL (ref 0–5)

## 2021-06-30 LAB — FINAL REPORT: ABNORMAL

## 2022-04-13 ENCOUNTER — HOSPITAL ENCOUNTER (EMERGENCY)
Facility: HOSPITAL | Age: 27
Discharge: HOME | End: 2022-04-13
Attending: EMERGENCY MEDICINE
Payer: COMMERCIAL

## 2022-04-13 VITALS
BODY MASS INDEX: 27 KG/M2 | DIASTOLIC BLOOD PRESSURE: 78 MMHG | WEIGHT: 150 LBS | TEMPERATURE: 98 F | SYSTOLIC BLOOD PRESSURE: 111 MMHG | OXYGEN SATURATION: 98 % | HEART RATE: 75 BPM | RESPIRATION RATE: 17 BRPM

## 2022-04-13 DIAGNOSIS — K52.9 GASTROENTERITIS: Primary | ICD-10-CM

## 2022-04-13 LAB
ALBUMIN SERPL-MCNC: 3.8 G/DL (ref 3.4–5)
ALBUMIN/GLOB SERPL: 1.4 {RATIO} (ref 1–2)
ALP LIVER SERPL-CCNC: 70 U/L
ALT SERPL-CCNC: 49 U/L
ANION GAP SERPL CALC-SCNC: 6 MMOL/L (ref 0–18)
AST SERPL-CCNC: 40 U/L (ref 15–37)
BASOPHILS # BLD AUTO: 0.04 X10(3) UL (ref 0–0.2)
BASOPHILS NFR BLD AUTO: 0.6 %
BILIRUB SERPL-MCNC: 0.5 MG/DL (ref 0.1–2)
BILIRUB UR QL STRIP.AUTO: NEGATIVE
BUN BLD-MCNC: 14 MG/DL (ref 7–18)
CALCIUM BLD-MCNC: 9.2 MG/DL (ref 8.5–10.1)
CHLORIDE SERPL-SCNC: 113 MMOL/L (ref 98–112)
CLARITY UR REFRACT.AUTO: CLEAR
CO2 SERPL-SCNC: 27 MMOL/L (ref 21–32)
COLOR UR AUTO: YELLOW
CREAT BLD-MCNC: 0.87 MG/DL
EOSINOPHIL # BLD AUTO: 0.04 X10(3) UL (ref 0–0.7)
EOSINOPHIL NFR BLD AUTO: 0.6 %
ERYTHROCYTE [DISTWIDTH] IN BLOOD BY AUTOMATED COUNT: 11.6 %
GLOBULIN PLAS-MCNC: 2.8 G/DL (ref 2.8–4.4)
GLUCOSE BLD-MCNC: 87 MG/DL (ref 70–99)
GLUCOSE UR STRIP.AUTO-MCNC: NEGATIVE MG/DL
HCT VFR BLD AUTO: 44.8 %
HGB BLD-MCNC: 15 G/DL
IMM GRANULOCYTES # BLD AUTO: 0.01 X10(3) UL (ref 0–1)
IMM GRANULOCYTES NFR BLD: 0.2 %
KETONES UR STRIP.AUTO-MCNC: NEGATIVE MG/DL
LEUKOCYTE ESTERASE UR QL STRIP.AUTO: NEGATIVE
LIPASE SERPL-CCNC: 69 U/L (ref 73–393)
LYMPHOCYTES # BLD AUTO: 2.01 X10(3) UL (ref 1–4)
LYMPHOCYTES NFR BLD AUTO: 32.1 %
MCH RBC QN AUTO: 30.2 PG (ref 26–34)
MCHC RBC AUTO-ENTMCNC: 33.5 G/DL (ref 31–37)
MCV RBC AUTO: 90.3 FL
MONOCYTES # BLD AUTO: 0.53 X10(3) UL (ref 0.1–1)
MONOCYTES NFR BLD AUTO: 8.5 %
NEUTROPHILS # BLD AUTO: 3.63 X10 (3) UL (ref 1.5–7.7)
NEUTROPHILS # BLD AUTO: 3.63 X10(3) UL (ref 1.5–7.7)
NEUTROPHILS NFR BLD AUTO: 58 %
NITRITE UR QL STRIP.AUTO: NEGATIVE
OSMOLALITY SERPL CALC.SUM OF ELEC: 302 MOSM/KG (ref 275–295)
PH UR STRIP.AUTO: 9 [PH] (ref 5–8)
PLATELET # BLD AUTO: 237 10(3)UL (ref 150–450)
POTASSIUM SERPL-SCNC: 3.7 MMOL/L (ref 3.5–5.1)
PROT SERPL-MCNC: 6.6 G/DL (ref 6.4–8.2)
PROT UR STRIP.AUTO-MCNC: 30 MG/DL
RBC # BLD AUTO: 4.96 X10(6)UL
RBC UR QL AUTO: NEGATIVE
SODIUM SERPL-SCNC: 146 MMOL/L (ref 136–145)
SP GR UR STRIP.AUTO: 1.02 (ref 1–1.03)
UROBILINOGEN UR STRIP.AUTO-MCNC: <2 MG/DL
WBC # BLD AUTO: 6.3 X10(3) UL (ref 4–11)

## 2022-04-13 PROCEDURE — 80053 COMPREHEN METABOLIC PANEL: CPT | Performed by: EMERGENCY MEDICINE

## 2022-04-13 PROCEDURE — 81001 URINALYSIS AUTO W/SCOPE: CPT | Performed by: EMERGENCY MEDICINE

## 2022-04-13 PROCEDURE — 96374 THER/PROPH/DIAG INJ IV PUSH: CPT

## 2022-04-13 PROCEDURE — 96376 TX/PRO/DX INJ SAME DRUG ADON: CPT

## 2022-04-13 PROCEDURE — 99284 EMERGENCY DEPT VISIT MOD MDM: CPT

## 2022-04-13 PROCEDURE — 96361 HYDRATE IV INFUSION ADD-ON: CPT

## 2022-04-13 PROCEDURE — 83690 ASSAY OF LIPASE: CPT | Performed by: EMERGENCY MEDICINE

## 2022-04-13 PROCEDURE — 85025 COMPLETE CBC W/AUTO DIFF WBC: CPT | Performed by: EMERGENCY MEDICINE

## 2022-04-13 RX ORDER — ONDANSETRON 4 MG/1
4 TABLET, ORALLY DISINTEGRATING ORAL EVERY 4 HOURS PRN
Qty: 10 TABLET | Refills: 0 | Status: SHIPPED | OUTPATIENT
Start: 2022-04-13 | End: 2022-04-20

## 2022-04-13 RX ORDER — ONDANSETRON 2 MG/ML
4 INJECTION INTRAMUSCULAR; INTRAVENOUS ONCE
Status: COMPLETED | OUTPATIENT
Start: 2022-04-13 | End: 2022-04-13

## 2022-04-13 NOTE — ED INITIAL ASSESSMENT (HPI)
PT TO THE ED VIA EMS FROM [facility] WITH C/O N/V/D X2 WEEKS INTERMITTENT. PT HAS PAIN ACROSS LOWER ABD AND IS DRY HEAVING. PT THINKS IT MIGHT BE SOMETHING THEY ATE.

## 2022-04-13 NOTE — ED QUICK NOTES
Pt reevaluated by er physician. Pt informed of all his test reports and plan of care.  Verbalizing understanding

## 2022-06-09 ENCOUNTER — OFFICE VISIT (OUTPATIENT)
Dept: OBGYN | Age: 27
End: 2022-06-09

## 2022-06-09 ENCOUNTER — TELEPHONE (OUTPATIENT)
Dept: OBGYN | Age: 27
End: 2022-06-09

## 2022-06-09 ENCOUNTER — LAB REQUISITION (OUTPATIENT)
Dept: LAB | Age: 27
End: 2022-06-09

## 2022-06-09 VITALS
SYSTOLIC BLOOD PRESSURE: 120 MMHG | TEMPERATURE: 98.5 F | BODY MASS INDEX: 31.54 KG/M2 | HEART RATE: 102 BPM | DIASTOLIC BLOOD PRESSURE: 78 MMHG | HEIGHT: 63 IN | WEIGHT: 178 LBS | RESPIRATION RATE: 18 BRPM

## 2022-06-09 DIAGNOSIS — N89.8 VAGINAL ODOR: ICD-10-CM

## 2022-06-09 DIAGNOSIS — R10.2 VULVAR PAIN: ICD-10-CM

## 2022-06-09 DIAGNOSIS — R10.2 PELVIC AND PERINEAL PAIN: ICD-10-CM

## 2022-06-09 DIAGNOSIS — R39.9 UTI SYMPTOMS: ICD-10-CM

## 2022-06-09 DIAGNOSIS — N94.9 FEMALE GENITAL SYMPTOMS: Primary | ICD-10-CM

## 2022-06-09 DIAGNOSIS — N89.8 OTHER SPECIFIED NONINFLAMMATORY DISORDERS OF VAGINA: ICD-10-CM

## 2022-06-09 DIAGNOSIS — R30.0 DYSURIA: ICD-10-CM

## 2022-06-09 DIAGNOSIS — R39.9 UNSPECIFIED SYMPTOMS AND SIGNS INVOLVING THE GENITOURINARY SYSTEM: ICD-10-CM

## 2022-06-09 LAB
APPEARANCE, POC: CLEAR
BILIRUBIN, POC: NEGATIVE
COLOR, POC: YELLOW
GLUCOSE UR-MCNC: NEGATIVE MG/DL
KETONES, POC: NEGATIVE MG/DL
NITRITE, POC: NEGATIVE
OCCULT BLOOD, POC: NEGATIVE
PH UR: 6.5 [PH] (ref 5–7)
PROT UR-MCNC: NEGATIVE MG/DL
SP GR UR: 1.03 (ref 1–1.03)
UROBILINOGEN UR-MCNC: 0.2 MG/DL (ref 0–1)
WBC (LEUKOCYTE) ESTERASE, POC: NEGATIVE

## 2022-06-09 PROCEDURE — 81002 URINALYSIS NONAUTO W/O SCOPE: CPT | Performed by: OBSTETRICS & GYNECOLOGY

## 2022-06-09 PROCEDURE — 87086 URINE CULTURE/COLONY COUNT: CPT | Performed by: OBSTETRICS & GYNECOLOGY

## 2022-06-09 PROCEDURE — 87529 HSV DNA AMP PROBE: CPT | Performed by: CLINICAL MEDICAL LABORATORY

## 2022-06-09 PROCEDURE — PSEU9934 HERPES SIMPLEX VIRUS 1 AND 2 BY PCR: Performed by: CLINICAL MEDICAL LABORATORY

## 2022-06-09 PROCEDURE — 87529 HSV DNA AMP PROBE: CPT | Performed by: OBSTETRICS & GYNECOLOGY

## 2022-06-09 PROCEDURE — 81514 NFCT DS BV&VAGINITIS DNA ALG: CPT | Performed by: OBSTETRICS & GYNECOLOGY

## 2022-06-09 PROCEDURE — 99214 OFFICE O/P EST MOD 30 MIN: CPT | Performed by: OBSTETRICS & GYNECOLOGY

## 2022-06-09 RX ORDER — TOLTERODINE 4 MG/1
4 CAPSULE, EXTENDED RELEASE ORAL DAILY
Qty: 14 CAPSULE | Refills: 0 | Status: SHIPPED | OUTPATIENT
Start: 2022-06-09 | End: 2022-10-05 | Stop reason: ALTCHOICE

## 2022-06-09 RX ORDER — DULOXETINE 40 MG/1
CAPSULE, DELAYED RELEASE ORAL DAILY
COMMUNITY
Start: 2022-05-21

## 2022-06-09 RX ORDER — HYDROXYZINE HYDROCHLORIDE 25 MG/1
TABLET, FILM COATED ORAL
COMMUNITY
Start: 2022-05-31

## 2022-06-09 RX ORDER — GABAPENTIN 300 MG/1
CAPSULE ORAL
COMMUNITY
Start: 2022-05-19

## 2022-06-09 RX ORDER — METHYLPREDNISOLONE 4 MG/1
TABLET ORAL
COMMUNITY
Start: 2022-06-02

## 2022-06-09 RX ORDER — QUETIAPINE FUMARATE 100 MG/1
150 TABLET, FILM COATED ORAL AT BEDTIME
COMMUNITY
Start: 2022-05-21

## 2022-06-10 LAB
BV BACTERIA DNA VAG QL NAA+PROBE: POSITIVE
C GLABRATA DNA VAG QL NAA+PROBE: NEGATIVE
C KRUSEI DNA VAG QL NAA+PROBE: NEGATIVE
CANDIDA DNA VAG QL NAA+PROBE: NEGATIVE
FINAL REPORT: NORMAL
T VAGINALIS DNA GENITAL QL NAA+PROBE: NEGATIVE

## 2022-06-10 RX ORDER — METRONIDAZOLE 500 MG/1
500 TABLET ORAL 2 TIMES DAILY
Qty: 14 TABLET | Refills: 0 | Status: SHIPPED | OUTPATIENT
Start: 2022-06-10 | End: 2022-06-17

## 2022-06-12 ENCOUNTER — TELEPHONE (OUTPATIENT)
Dept: OBGYN | Age: 27
End: 2022-06-12

## 2022-06-12 DIAGNOSIS — R10.2 PELVIC AND PERINEAL PAIN: Primary | ICD-10-CM

## 2022-06-12 DIAGNOSIS — R10.2 VAGINAL PAIN: ICD-10-CM

## 2022-06-13 LAB
HSV1 DNA SPEC QL NAA+PROBE: NOT DETECTED
HSV1 DNA SPEC QL NAA+PROBE: NOT DETECTED
HSV2 DNA SPEC QL NAA+PROBE: NOT DETECTED
HSV2 DNA SPEC QL NAA+PROBE: NOT DETECTED
SERVICE CMNT-IMP: NORMAL
SERVICE CMNT-IMP: NORMAL

## 2022-08-10 ENCOUNTER — WALK IN (OUTPATIENT)
Dept: URGENT CARE | Age: 27
End: 2022-08-10

## 2022-08-10 VITALS
RESPIRATION RATE: 18 BRPM | TEMPERATURE: 97.8 F | DIASTOLIC BLOOD PRESSURE: 82 MMHG | SYSTOLIC BLOOD PRESSURE: 124 MMHG | OXYGEN SATURATION: 100 % | HEART RATE: 121 BPM

## 2022-08-10 DIAGNOSIS — Z20.822 SUSPECTED COVID-19 VIRUS INFECTION: ICD-10-CM

## 2022-08-10 DIAGNOSIS — R07.0 THROAT PAIN: Primary | ICD-10-CM

## 2022-08-10 LAB
INTERNAL PROCEDURAL CONTROLS ACCEPTABLE: YES
INTERNAL PROCEDURAL CONTROLS ACCEPTABLE: YES
S PYO AG THROAT QL IA.RAPID: NEGATIVE
SARS-COV+SARS-COV-2 AG RESP QL IA.RAPID: NOT DETECTED
TEST LOT EXPIRATION DATE: NORMAL
TEST LOT EXPIRATION DATE: NORMAL
TEST LOT NUMBER: NORMAL
TEST LOT NUMBER: NORMAL

## 2022-08-10 PROCEDURE — 87880 STREP A ASSAY W/OPTIC: CPT | Performed by: FAMILY MEDICINE

## 2022-08-10 PROCEDURE — 99214 OFFICE O/P EST MOD 30 MIN: CPT | Performed by: FAMILY MEDICINE

## 2022-08-10 PROCEDURE — 87426 SARSCOV CORONAVIRUS AG IA: CPT | Performed by: FAMILY MEDICINE

## 2022-08-10 ASSESSMENT — PAIN SCALES - GENERAL: PAINLEVEL: 5

## 2022-10-04 ENCOUNTER — TELEPHONE (OUTPATIENT)
Dept: OBGYN | Age: 27
End: 2022-10-04

## 2022-10-05 ENCOUNTER — LAB REQUISITION (OUTPATIENT)
Dept: LAB | Age: 27
End: 2022-10-05

## 2022-10-05 ENCOUNTER — OFFICE VISIT (OUTPATIENT)
Dept: OBGYN | Age: 27
End: 2022-10-05

## 2022-10-05 VITALS — DIASTOLIC BLOOD PRESSURE: 90 MMHG | SYSTOLIC BLOOD PRESSURE: 122 MMHG

## 2022-10-05 DIAGNOSIS — M62.89 PFD (PELVIC FLOOR DYSFUNCTION): Primary | ICD-10-CM

## 2022-10-05 DIAGNOSIS — R10.32 LLQ PAIN: ICD-10-CM

## 2022-10-05 DIAGNOSIS — M62.89 OTHER SPECIFIED DISORDERS OF MUSCLE: ICD-10-CM

## 2022-10-05 PROCEDURE — 87086 URINE CULTURE/COLONY COUNT: CPT | Performed by: NURSE PRACTITIONER

## 2022-10-05 PROCEDURE — PSEU9005 URINE, BACTERIAL CULTURE: Performed by: CLINICAL MEDICAL LABORATORY

## 2022-10-05 PROCEDURE — 99214 OFFICE O/P EST MOD 30 MIN: CPT | Performed by: NURSE PRACTITIONER

## 2022-10-05 PROCEDURE — 87086 URINE CULTURE/COLONY COUNT: CPT | Performed by: CLINICAL MEDICAL LABORATORY

## 2022-10-05 RX ORDER — DULOXETIN HYDROCHLORIDE 30 MG/1
CAPSULE, DELAYED RELEASE ORAL
COMMUNITY
Start: 2022-09-22

## 2022-10-05 RX ORDER — HYDROXYZINE HYDROCHLORIDE 25 MG/1
25 TABLET, FILM COATED ORAL
COMMUNITY
Start: 2022-09-27

## 2022-10-05 RX ORDER — GABAPENTIN 300 MG/1
300 CAPSULE ORAL
COMMUNITY
Start: 2022-07-28

## 2022-10-06 ENCOUNTER — TELEPHONE (OUTPATIENT)
Dept: PHYSICAL THERAPY | Age: 27
End: 2022-10-06

## 2022-10-07 LAB
BACTERIA UR CULT: NORMAL
BACTERIA UR CULT: NORMAL

## 2022-10-12 ENCOUNTER — OFFICE VISIT (OUTPATIENT)
Dept: PHYSICAL THERAPY | Age: 27
End: 2022-10-12

## 2022-10-12 ENCOUNTER — TELEPHONE (OUTPATIENT)
Dept: OBGYN | Age: 27
End: 2022-10-12

## 2022-10-12 ENCOUNTER — TELEPHONE (OUTPATIENT)
Dept: PHYSICAL THERAPY | Age: 27
End: 2022-10-12

## 2022-10-12 DIAGNOSIS — R10.2 PELVIC AND PERINEAL PAIN: ICD-10-CM

## 2022-10-12 DIAGNOSIS — R10.2 VAGINAL PAIN: ICD-10-CM

## 2022-10-12 DIAGNOSIS — M62.89 MUSCLE TIGHTNESS: Primary | ICD-10-CM

## 2022-10-12 DIAGNOSIS — R10.2 PELVIC AND PERINEAL PAIN: Primary | ICD-10-CM

## 2022-10-12 DIAGNOSIS — M62.81 MUSCLE WEAKNESS: ICD-10-CM

## 2022-10-12 PROCEDURE — 97162 PT EVAL MOD COMPLEX 30 MIN: CPT | Performed by: PHYSICAL THERAPIST

## 2022-10-12 PROCEDURE — 97110 THERAPEUTIC EXERCISES: CPT | Performed by: PHYSICAL THERAPIST

## 2022-10-12 ASSESSMENT — ENCOUNTER SYMPTOMS
QUALITY: TIGHT
QUALITY: BURNING
PAIN FREQUENCY: CONSTANT
PAIN SCALE AT HIGHEST: 10

## 2022-10-25 ENCOUNTER — TELEPHONE (OUTPATIENT)
Dept: PHYSICAL THERAPY | Age: 27
End: 2022-10-25

## 2022-10-26 ENCOUNTER — OFFICE VISIT (OUTPATIENT)
Dept: PHYSICAL THERAPY | Age: 27
End: 2022-10-26

## 2022-10-26 DIAGNOSIS — R10.2 VAGINAL PAIN: ICD-10-CM

## 2022-10-26 DIAGNOSIS — M62.81 MUSCLE WEAKNESS: ICD-10-CM

## 2022-10-26 DIAGNOSIS — M62.89 MUSCLE TIGHTNESS: Primary | ICD-10-CM

## 2022-10-26 PROCEDURE — 97110 THERAPEUTIC EXERCISES: CPT | Performed by: PHYSICAL THERAPIST

## 2022-10-26 PROCEDURE — 97140 MANUAL THERAPY 1/> REGIONS: CPT | Performed by: PHYSICAL THERAPIST

## 2022-11-09 ENCOUNTER — APPOINTMENT (OUTPATIENT)
Dept: PHYSICAL THERAPY | Age: 27
End: 2022-11-09
Attending: NURSE PRACTITIONER

## 2022-11-16 ENCOUNTER — OFFICE VISIT (OUTPATIENT)
Dept: PHYSICAL THERAPY | Age: 27
End: 2022-11-16

## 2022-11-16 DIAGNOSIS — M62.89 MUSCLE TIGHTNESS: Primary | ICD-10-CM

## 2022-11-16 DIAGNOSIS — M62.81 MUSCLE WEAKNESS: ICD-10-CM

## 2022-11-16 DIAGNOSIS — R10.2 VAGINAL PAIN: ICD-10-CM

## 2022-11-16 PROCEDURE — 97110 THERAPEUTIC EXERCISES: CPT | Performed by: PHYSICAL THERAPIST

## 2022-11-16 PROCEDURE — 97140 MANUAL THERAPY 1/> REGIONS: CPT | Performed by: PHYSICAL THERAPIST

## 2022-11-30 ENCOUNTER — OFFICE VISIT (OUTPATIENT)
Dept: PHYSICAL THERAPY | Age: 27
End: 2022-11-30

## 2022-11-30 DIAGNOSIS — M62.81 MUSCLE WEAKNESS: ICD-10-CM

## 2022-11-30 DIAGNOSIS — M62.89 MUSCLE TIGHTNESS: Primary | ICD-10-CM

## 2022-11-30 DIAGNOSIS — R10.2 VAGINAL PAIN: ICD-10-CM

## 2022-11-30 PROCEDURE — 97140 MANUAL THERAPY 1/> REGIONS: CPT | Performed by: PHYSICAL THERAPIST

## 2022-11-30 PROCEDURE — 97110 THERAPEUTIC EXERCISES: CPT | Performed by: PHYSICAL THERAPIST

## 2022-12-07 ENCOUNTER — OFFICE VISIT (OUTPATIENT)
Dept: PHYSICAL THERAPY | Age: 27
End: 2022-12-07

## 2022-12-07 DIAGNOSIS — R10.2 VAGINAL PAIN: ICD-10-CM

## 2022-12-07 DIAGNOSIS — M62.81 MUSCLE WEAKNESS: ICD-10-CM

## 2022-12-07 DIAGNOSIS — M62.89 MUSCLE TIGHTNESS: Primary | ICD-10-CM

## 2022-12-07 PROCEDURE — 97014 ELECTRIC STIMULATION THERAPY: CPT | Performed by: PHYSICAL THERAPIST

## 2022-12-07 PROCEDURE — 97140 MANUAL THERAPY 1/> REGIONS: CPT | Performed by: PHYSICAL THERAPIST

## 2022-12-07 PROCEDURE — 97112 NEUROMUSCULAR REEDUCATION: CPT | Performed by: PHYSICAL THERAPIST

## 2022-12-14 ENCOUNTER — APPOINTMENT (OUTPATIENT)
Dept: PHYSICAL THERAPY | Age: 27
End: 2022-12-14

## 2022-12-14 ENCOUNTER — TELEPHONE (OUTPATIENT)
Dept: PHYSICAL THERAPY | Age: 27
End: 2022-12-14

## 2022-12-21 ENCOUNTER — APPOINTMENT (OUTPATIENT)
Dept: PHYSICAL THERAPY | Age: 27
End: 2022-12-21

## 2022-12-28 ENCOUNTER — OFFICE VISIT (OUTPATIENT)
Dept: PHYSICAL THERAPY | Age: 27
End: 2022-12-28

## 2022-12-28 DIAGNOSIS — M62.89 MUSCLE TIGHTNESS: Primary | ICD-10-CM

## 2022-12-28 DIAGNOSIS — M62.81 MUSCLE WEAKNESS: ICD-10-CM

## 2022-12-28 DIAGNOSIS — R10.2 VAGINAL PAIN: ICD-10-CM

## 2022-12-28 PROCEDURE — 97110 THERAPEUTIC EXERCISES: CPT | Performed by: PHYSICAL THERAPIST

## 2022-12-28 PROCEDURE — 97140 MANUAL THERAPY 1/> REGIONS: CPT | Performed by: PHYSICAL THERAPIST

## 2023-07-28 ENCOUNTER — EMPLOYEE HEALTH (OUTPATIENT)
Dept: OTHER | Facility: HOSPITAL | Age: 28
End: 2023-07-28
Attending: PREVENTIVE MEDICINE

## 2023-07-28 DIAGNOSIS — Z00.00 WELLNESS EXAMINATION: Primary | ICD-10-CM

## 2023-07-28 PROCEDURE — 86787 VARICELLA-ZOSTER ANTIBODY: CPT

## 2023-07-28 PROCEDURE — 86480 TB TEST CELL IMMUN MEASURE: CPT

## 2023-07-31 LAB
M TB IFN-G CD4+ T-CELLS BLD-ACNC: 0 IU/ML
M TB TUBERC IFN-G BLD QL: NEGATIVE
M TB TUBERC IGNF/MITOGEN IGNF CONTROL: >10 IU/ML
QFT TB1 AG MINUS NIL: 0 IU/ML
QFT TB2 AG MINUS NIL: 0 IU/ML
VZV IGG SER IA-ACNC: 2615 (ref 165–?)

## 2023-08-25 PROCEDURE — 93010 ELECTROCARDIOGRAM REPORT: CPT

## 2023-08-25 PROCEDURE — 99285 EMERGENCY DEPT VISIT HI MDM: CPT

## 2023-08-25 PROCEDURE — 93005 ELECTROCARDIOGRAM TRACING: CPT

## 2023-08-25 PROCEDURE — 99284 EMERGENCY DEPT VISIT MOD MDM: CPT

## 2023-08-26 ENCOUNTER — HOSPITAL ENCOUNTER (EMERGENCY)
Facility: HOSPITAL | Age: 28
Discharge: HOME | End: 2023-08-26
Attending: EMERGENCY MEDICINE
Payer: COMMERCIAL

## 2023-08-26 ENCOUNTER — APPOINTMENT (OUTPATIENT)
Dept: GENERAL RADIOLOGY | Facility: HOSPITAL | Age: 28
End: 2023-08-26
Attending: EMERGENCY MEDICINE
Payer: COMMERCIAL

## 2023-08-26 VITALS
SYSTOLIC BLOOD PRESSURE: 105 MMHG | RESPIRATION RATE: 18 BRPM | OXYGEN SATURATION: 95 % | HEART RATE: 64 BPM | DIASTOLIC BLOOD PRESSURE: 74 MMHG | HEIGHT: 63 IN | TEMPERATURE: 97 F | WEIGHT: 190 LBS | BODY MASS INDEX: 33.66 KG/M2

## 2023-08-26 DIAGNOSIS — G43.809 OTHER MIGRAINE WITHOUT STATUS MIGRAINOSUS, NOT INTRACTABLE: Primary | ICD-10-CM

## 2023-08-26 DIAGNOSIS — R07.89 CHEST PAIN, ATYPICAL: ICD-10-CM

## 2023-08-26 LAB
ALBUMIN SERPL-MCNC: 3.7 G/DL (ref 3.4–5)
ALBUMIN/GLOB SERPL: 1.3 {RATIO} (ref 1–2)
ALP LIVER SERPL-CCNC: 69 U/L
ALT SERPL-CCNC: 29 U/L
ANION GAP SERPL CALC-SCNC: 4 MMOL/L (ref 0–18)
AST SERPL-CCNC: 7 U/L (ref 15–37)
BASOPHILS # BLD AUTO: 0.05 X10(3) UL (ref 0–0.2)
BASOPHILS NFR BLD AUTO: 0.8 %
BILIRUB SERPL-MCNC: 0.7 MG/DL (ref 0.1–2)
BUN BLD-MCNC: 10 MG/DL (ref 7–18)
CALCIUM BLD-MCNC: 8.7 MG/DL (ref 8.5–10.1)
CHLORIDE SERPL-SCNC: 110 MMOL/L (ref 98–112)
CO2 SERPL-SCNC: 26 MMOL/L (ref 21–32)
CREAT BLD-MCNC: 0.72 MG/DL
EGFRCR SERPLBLD CKD-EPI 2021: 127 ML/MIN/1.73M2 (ref 60–?)
EOSINOPHIL # BLD AUTO: 0.13 X10(3) UL (ref 0–0.7)
EOSINOPHIL NFR BLD AUTO: 2 %
ERYTHROCYTE [DISTWIDTH] IN BLOOD BY AUTOMATED COUNT: 11.9 %
GLOBULIN PLAS-MCNC: 2.8 G/DL (ref 2.8–4.4)
GLUCOSE BLD-MCNC: 94 MG/DL (ref 70–99)
HCT VFR BLD AUTO: 46.1 %
HGB BLD-MCNC: 15.8 G/DL
IMM GRANULOCYTES # BLD AUTO: 0.01 X10(3) UL (ref 0–1)
IMM GRANULOCYTES NFR BLD: 0.2 %
LYMPHOCYTES # BLD AUTO: 2.86 X10(3) UL (ref 1–4)
LYMPHOCYTES NFR BLD AUTO: 43.9 %
MCH RBC QN AUTO: 30.5 PG (ref 26–34)
MCHC RBC AUTO-ENTMCNC: 34.3 G/DL (ref 31–37)
MCV RBC AUTO: 89 FL
MONOCYTES # BLD AUTO: 0.69 X10(3) UL (ref 0.1–1)
MONOCYTES NFR BLD AUTO: 10.6 %
NEUTROPHILS # BLD AUTO: 2.78 X10 (3) UL (ref 1.5–7.7)
NEUTROPHILS # BLD AUTO: 2.78 X10(3) UL (ref 1.5–7.7)
NEUTROPHILS NFR BLD AUTO: 42.5 %
OSMOLALITY SERPL CALC.SUM OF ELEC: 289 MOSM/KG (ref 275–295)
PLATELET # BLD AUTO: 208 10(3)UL (ref 150–450)
POTASSIUM SERPL-SCNC: 3.7 MMOL/L (ref 3.5–5.1)
PROT SERPL-MCNC: 6.5 G/DL (ref 6.4–8.2)
RBC # BLD AUTO: 5.18 X10(6)UL
SODIUM SERPL-SCNC: 140 MMOL/L (ref 136–145)
TROPONIN I HIGH SENSITIVITY: 5 NG/L
WBC # BLD AUTO: 6.5 X10(3) UL (ref 4–11)

## 2023-08-26 PROCEDURE — 96374 THER/PROPH/DIAG INJ IV PUSH: CPT

## 2023-08-26 PROCEDURE — 84484 ASSAY OF TROPONIN QUANT: CPT | Performed by: EMERGENCY MEDICINE

## 2023-08-26 PROCEDURE — 96361 HYDRATE IV INFUSION ADD-ON: CPT

## 2023-08-26 PROCEDURE — 71045 X-RAY EXAM CHEST 1 VIEW: CPT | Performed by: EMERGENCY MEDICINE

## 2023-08-26 PROCEDURE — 80053 COMPREHEN METABOLIC PANEL: CPT | Performed by: EMERGENCY MEDICINE

## 2023-08-26 PROCEDURE — 85025 COMPLETE CBC W/AUTO DIFF WBC: CPT | Performed by: EMERGENCY MEDICINE

## 2023-08-26 PROCEDURE — 96375 TX/PRO/DX INJ NEW DRUG ADDON: CPT

## 2023-08-26 RX ORDER — KETOROLAC TROMETHAMINE 15 MG/ML
30 INJECTION, SOLUTION INTRAMUSCULAR; INTRAVENOUS ONCE
Status: COMPLETED | OUTPATIENT
Start: 2023-08-26 | End: 2023-08-26

## 2023-08-26 RX ORDER — ONDANSETRON 2 MG/ML
4 INJECTION INTRAMUSCULAR; INTRAVENOUS ONCE
Status: COMPLETED | OUTPATIENT
Start: 2023-08-26 | End: 2023-08-26

## 2023-08-26 NOTE — ED INITIAL ASSESSMENT (HPI)
Pt presents to ed c/o headache 7/10, chest tightness at a 3/10 that started this evening. Pt denies blurred vision, denies vomiting.

## 2023-08-27 LAB
ATRIAL RATE: 103 BPM
P AXIS: 51 DEGREES
P-R INTERVAL: 138 MS
Q-T INTERVAL: 338 MS
QRS DURATION: 72 MS
QTC CALCULATION (BEZET): 442 MS
R AXIS: 61 DEGREES
T AXIS: 15 DEGREES
VENTRICULAR RATE: 103 BPM

## 2023-09-01 ENCOUNTER — HOSPITAL ENCOUNTER (EMERGENCY)
Facility: HOSPITAL | Age: 28
Discharge: HOME | End: 2023-09-01
Attending: EMERGENCY MEDICINE
Payer: COMMERCIAL

## 2023-09-01 ENCOUNTER — APPOINTMENT (OUTPATIENT)
Dept: CT IMAGING | Facility: HOSPITAL | Age: 28
End: 2023-09-01
Attending: EMERGENCY MEDICINE
Payer: COMMERCIAL

## 2023-09-01 VITALS
DIASTOLIC BLOOD PRESSURE: 69 MMHG | HEART RATE: 81 BPM | TEMPERATURE: 98 F | RESPIRATION RATE: 18 BRPM | HEIGHT: 62 IN | WEIGHT: 189.63 LBS | BODY MASS INDEX: 34.89 KG/M2 | SYSTOLIC BLOOD PRESSURE: 101 MMHG | OXYGEN SATURATION: 99 %

## 2023-09-01 DIAGNOSIS — G43.809 OTHER MIGRAINE WITHOUT STATUS MIGRAINOSUS, NOT INTRACTABLE: Primary | ICD-10-CM

## 2023-09-01 LAB
ALBUMIN SERPL-MCNC: 4.2 G/DL (ref 3.4–5)
ALBUMIN/GLOB SERPL: 1.4 {RATIO} (ref 1–2)
ALP LIVER SERPL-CCNC: 78 U/L
ALT SERPL-CCNC: 28 U/L
ANION GAP SERPL CALC-SCNC: 3 MMOL/L (ref 0–18)
AST SERPL-CCNC: 18 U/L (ref 15–37)
BASOPHILS # BLD AUTO: 0.04 X10(3) UL (ref 0–0.2)
BASOPHILS NFR BLD AUTO: 0.5 %
BILIRUB SERPL-MCNC: 0.9 MG/DL (ref 0.1–2)
BUN BLD-MCNC: 8 MG/DL (ref 7–18)
CALCIUM BLD-MCNC: 9.2 MG/DL (ref 8.5–10.1)
CHLORIDE SERPL-SCNC: 111 MMOL/L (ref 98–112)
CO2 SERPL-SCNC: 28 MMOL/L (ref 21–32)
CREAT BLD-MCNC: 0.73 MG/DL
EGFRCR SERPLBLD CKD-EPI 2021: 126 ML/MIN/1.73M2 (ref 60–?)
EOSINOPHIL # BLD AUTO: 0.06 X10(3) UL (ref 0–0.7)
EOSINOPHIL NFR BLD AUTO: 0.8 %
ERYTHROCYTE [DISTWIDTH] IN BLOOD BY AUTOMATED COUNT: 11.9 %
GLOBULIN PLAS-MCNC: 2.9 G/DL (ref 2.8–4.4)
GLUCOSE BLD-MCNC: 97 MG/DL (ref 70–99)
HCT VFR BLD AUTO: 49.2 %
HGB BLD-MCNC: 16.9 G/DL
IMM GRANULOCYTES # BLD AUTO: 0.02 X10(3) UL (ref 0–1)
IMM GRANULOCYTES NFR BLD: 0.3 %
LYMPHOCYTES # BLD AUTO: 2.72 X10(3) UL (ref 1–4)
LYMPHOCYTES NFR BLD AUTO: 35.3 %
MCH RBC QN AUTO: 30.5 PG (ref 26–34)
MCHC RBC AUTO-ENTMCNC: 34.3 G/DL (ref 31–37)
MCV RBC AUTO: 88.6 FL
MONOCYTES # BLD AUTO: 0.74 X10(3) UL (ref 0.1–1)
MONOCYTES NFR BLD AUTO: 9.6 %
NEUTROPHILS # BLD AUTO: 4.12 X10 (3) UL (ref 1.5–7.7)
NEUTROPHILS # BLD AUTO: 4.12 X10(3) UL (ref 1.5–7.7)
NEUTROPHILS NFR BLD AUTO: 53.5 %
OSMOLALITY SERPL CALC.SUM OF ELEC: 292 MOSM/KG (ref 275–295)
PLATELET # BLD AUTO: 244 10(3)UL (ref 150–450)
POTASSIUM SERPL-SCNC: 3.4 MMOL/L (ref 3.5–5.1)
PROT SERPL-MCNC: 7.1 G/DL (ref 6.4–8.2)
RBC # BLD AUTO: 5.55 X10(6)UL
SODIUM SERPL-SCNC: 142 MMOL/L (ref 136–145)
WBC # BLD AUTO: 7.7 X10(3) UL (ref 4–11)

## 2023-09-01 PROCEDURE — 96375 TX/PRO/DX INJ NEW DRUG ADDON: CPT

## 2023-09-01 PROCEDURE — 80053 COMPREHEN METABOLIC PANEL: CPT | Performed by: EMERGENCY MEDICINE

## 2023-09-01 PROCEDURE — 96361 HYDRATE IV INFUSION ADD-ON: CPT

## 2023-09-01 PROCEDURE — 85025 COMPLETE CBC W/AUTO DIFF WBC: CPT | Performed by: EMERGENCY MEDICINE

## 2023-09-01 PROCEDURE — 99285 EMERGENCY DEPT VISIT HI MDM: CPT

## 2023-09-01 PROCEDURE — 70450 CT HEAD/BRAIN W/O DYE: CPT | Performed by: EMERGENCY MEDICINE

## 2023-09-01 PROCEDURE — 99284 EMERGENCY DEPT VISIT MOD MDM: CPT

## 2023-09-01 PROCEDURE — 96374 THER/PROPH/DIAG INJ IV PUSH: CPT

## 2023-09-01 RX ORDER — ONDANSETRON 2 MG/ML
4 INJECTION INTRAMUSCULAR; INTRAVENOUS ONCE
Status: COMPLETED | OUTPATIENT
Start: 2023-09-01 | End: 2023-09-01

## 2023-09-01 RX ORDER — DEXAMETHASONE SODIUM PHOSPHATE 10 MG/ML
10 INJECTION, SOLUTION INTRAMUSCULAR; INTRAVENOUS ONCE
Status: COMPLETED | OUTPATIENT
Start: 2023-09-01 | End: 2023-09-01

## 2023-09-01 RX ORDER — ONDANSETRON 2 MG/ML
INJECTION INTRAMUSCULAR; INTRAVENOUS
Status: COMPLETED
Start: 2023-09-01 | End: 2023-09-01

## 2023-09-01 RX ORDER — KETOROLAC TROMETHAMINE 15 MG/ML
30 INJECTION, SOLUTION INTRAMUSCULAR; INTRAVENOUS ONCE
Status: COMPLETED | OUTPATIENT
Start: 2023-09-01 | End: 2023-09-01

## 2023-09-01 RX ORDER — ONDANSETRON 4 MG/1
4 TABLET, ORALLY DISINTEGRATING ORAL EVERY 8 HOURS PRN
Qty: 10 TABLET | Refills: 0 | Status: SHIPPED | OUTPATIENT
Start: 2023-09-01 | End: 2023-09-25

## 2023-09-01 RX ORDER — IBUPROFEN 600 MG/1
600 TABLET ORAL EVERY 8 HOURS PRN
Qty: 30 TABLET | Refills: 0 | Status: SHIPPED | OUTPATIENT
Start: 2023-09-01 | End: 2023-09-25

## 2023-09-01 RX ORDER — POTASSIUM CHLORIDE 20 MEQ/1
20 TABLET, EXTENDED RELEASE ORAL ONCE
Status: COMPLETED | OUTPATIENT
Start: 2023-09-01 | End: 2023-09-01

## 2023-09-01 RX ORDER — DIPHENHYDRAMINE HYDROCHLORIDE 50 MG/ML
25 INJECTION INTRAMUSCULAR; INTRAVENOUS ONCE
Status: COMPLETED | OUTPATIENT
Start: 2023-09-01 | End: 2023-09-01

## 2023-09-01 RX ORDER — METOCLOPRAMIDE HYDROCHLORIDE 5 MG/ML
10 INJECTION INTRAMUSCULAR; INTRAVENOUS ONCE
Status: COMPLETED | OUTPATIENT
Start: 2023-09-01 | End: 2023-09-01

## 2023-09-25 ENCOUNTER — HOSPITAL ENCOUNTER (OUTPATIENT)
Age: 28
Discharge: HOME | End: 2023-09-25
Payer: COMMERCIAL

## 2023-09-25 VITALS
TEMPERATURE: 99 F | OXYGEN SATURATION: 100 % | DIASTOLIC BLOOD PRESSURE: 80 MMHG | RESPIRATION RATE: 20 BRPM | SYSTOLIC BLOOD PRESSURE: 133 MMHG | HEART RATE: 112 BPM

## 2023-09-25 DIAGNOSIS — J02.9 SORE THROAT: Primary | ICD-10-CM

## 2023-09-25 DIAGNOSIS — H66.93 BILATERAL OTITIS MEDIA, UNSPECIFIED OTITIS MEDIA TYPE: ICD-10-CM

## 2023-09-25 LAB
S PYO AG THROAT QL: NEGATIVE
SARS-COV-2 RNA RESP QL NAA+PROBE: NOT DETECTED

## 2023-09-25 PROCEDURE — 87880 STREP A ASSAY W/OPTIC: CPT | Performed by: NURSE PRACTITIONER

## 2023-09-25 PROCEDURE — 99213 OFFICE O/P EST LOW 20 MIN: CPT | Performed by: NURSE PRACTITIONER

## 2023-09-25 PROCEDURE — U0002 COVID-19 LAB TEST NON-CDC: HCPCS | Performed by: NURSE PRACTITIONER

## 2023-09-25 RX ORDER — AMOXICILLIN 875 MG/1
875 TABLET, COATED ORAL 2 TIMES DAILY
Qty: 20 TABLET | Refills: 0 | Status: SHIPPED | OUTPATIENT
Start: 2023-09-25 | End: 2023-10-05

## 2024-01-18 ENCOUNTER — OFFICE VISIT (OUTPATIENT)
Dept: FAMILY MEDICINE CLINIC | Facility: CLINIC | Age: 29
End: 2024-01-18
Payer: COMMERCIAL

## 2024-01-18 VITALS
OXYGEN SATURATION: 97 % | RESPIRATION RATE: 18 BRPM | BODY MASS INDEX: 36 KG/M2 | TEMPERATURE: 100 F | SYSTOLIC BLOOD PRESSURE: 122 MMHG | HEART RATE: 117 BPM | DIASTOLIC BLOOD PRESSURE: 86 MMHG | WEIGHT: 195.38 LBS

## 2024-01-18 DIAGNOSIS — G43.809 OTHER MIGRAINE WITHOUT STATUS MIGRAINOSUS, NOT INTRACTABLE: Primary | ICD-10-CM

## 2024-01-18 DIAGNOSIS — R91.1 PULMONARY NODULE: ICD-10-CM

## 2024-01-18 DIAGNOSIS — R11.0 NAUSEA: ICD-10-CM

## 2024-01-18 DIAGNOSIS — F17.200 TOBACCO DEPENDENCE: ICD-10-CM

## 2024-01-18 PROBLEM — K52.9 GASTROENTERITIS: Status: ACTIVE | Noted: 2018-06-04

## 2024-01-18 PROBLEM — J40 BRONCHITIS: Status: ACTIVE | Noted: 2020-04-25

## 2024-01-18 PROBLEM — M62.89 MUSCLE TIGHTNESS: Status: ACTIVE | Noted: 2022-10-12

## 2024-01-18 PROBLEM — J30.9 ALLERGIC RHINITIS: Status: ACTIVE | Noted: 2024-01-18

## 2024-01-18 PROBLEM — R10.2 PELVIC PAIN: Status: ACTIVE | Noted: 2019-12-16

## 2024-01-18 PROBLEM — R10.2 VAGINAL PAIN: Status: ACTIVE | Noted: 2022-10-12

## 2024-01-18 PROBLEM — N80.9 ENDOMETRIOSIS: Status: ACTIVE | Noted: 2024-01-18

## 2024-01-18 PROBLEM — M62.81 MUSCLE WEAKNESS: Status: ACTIVE | Noted: 2022-10-12

## 2024-01-18 PROCEDURE — 3074F SYST BP LT 130 MM HG: CPT | Performed by: FAMILY MEDICINE

## 2024-01-18 PROCEDURE — 3079F DIAST BP 80-89 MM HG: CPT | Performed by: FAMILY MEDICINE

## 2024-01-18 PROCEDURE — 99204 OFFICE O/P NEW MOD 45 MIN: CPT | Performed by: FAMILY MEDICINE

## 2024-01-18 RX ORDER — HYDROXYZINE HYDROCHLORIDE 25 MG/1
TABLET, FILM COATED ORAL
COMMUNITY
Start: 2022-05-31 | End: 2024-01-18 | Stop reason: ALTCHOICE

## 2024-01-18 RX ORDER — BUTALBITAL, ASPIRIN, AND CAFFEINE 325; 50; 40 MG/1; MG/1; MG/1
1 CAPSULE ORAL EVERY 6 HOURS PRN
COMMUNITY
Start: 2023-09-03

## 2024-01-18 RX ORDER — CLONAZEPAM 1 MG/1
1 TABLET ORAL
COMMUNITY
End: 2024-01-18 | Stop reason: ALTCHOICE

## 2024-01-18 RX ORDER — PREDNISONE 20 MG/1
40 TABLET ORAL DAILY
COMMUNITY
Start: 2020-04-25 | End: 2024-01-18 | Stop reason: ALTCHOICE

## 2024-01-18 RX ORDER — CYCLOBENZAPRINE HCL 10 MG
TABLET ORAL
COMMUNITY
Start: 2021-06-27 | End: 2024-01-18 | Stop reason: ALTCHOICE

## 2024-01-18 RX ORDER — TRAZODONE HYDROCHLORIDE 50 MG/1
100 TABLET ORAL NIGHTLY
COMMUNITY
End: 2024-01-18 | Stop reason: ALTCHOICE

## 2024-01-18 RX ORDER — ERGOCALCIFEROL 1.25 MG/1
50000 CAPSULE ORAL WEEKLY
COMMUNITY
Start: 2020-06-22 | End: 2024-01-18 | Stop reason: ALTCHOICE

## 2024-01-18 RX ORDER — NABUMETONE 500 MG/1
TABLET, FILM COATED ORAL
COMMUNITY
Start: 2021-06-27 | End: 2024-01-18 | Stop reason: ALTCHOICE

## 2024-01-18 RX ORDER — ASENAPINE 10 MG/1
10 TABLET SUBLINGUAL NIGHTLY
COMMUNITY
End: 2024-01-18 | Stop reason: ALTCHOICE

## 2024-01-18 RX ORDER — ACETAMINOPHEN 325 MG/1
650 TABLET ORAL
COMMUNITY

## 2024-01-18 RX ORDER — ONDANSETRON 4 MG/1
TABLET, ORALLY DISINTEGRATING ORAL
Qty: 12 TABLET | Refills: 3 | Status: SHIPPED | OUTPATIENT
Start: 2024-01-18

## 2024-01-18 RX ORDER — AZITHROMYCIN 250 MG/1
TABLET, FILM COATED ORAL
COMMUNITY
End: 2024-01-18 | Stop reason: ALTCHOICE

## 2024-01-18 RX ORDER — BUPROPION HYDROCHLORIDE 75 MG/1
150 TABLET ORAL NIGHTLY
COMMUNITY
Start: 2019-09-19 | End: 2024-01-18 | Stop reason: ALTCHOICE

## 2024-01-18 RX ORDER — ONDANSETRON 4 MG/1
TABLET, ORALLY DISINTEGRATING ORAL
COMMUNITY
Start: 2023-09-26 | End: 2024-01-18

## 2024-01-18 RX ORDER — TESTOSTERONE CYPIONATE 200 MG/ML
INJECTION, SOLUTION INTRAMUSCULAR
COMMUNITY
End: 2024-01-18 | Stop reason: DRUGHIGH

## 2024-01-18 RX ORDER — SUMATRIPTAN 50 MG/1
50 TABLET, FILM COATED ORAL EVERY 2 HOUR PRN
Qty: 4 TABLET | Refills: 0 | Status: SHIPPED | OUTPATIENT
Start: 2024-01-18 | End: 2024-01-24 | Stop reason: ALTCHOICE

## 2024-01-18 RX ORDER — NAPROXEN 500 MG/1
500 TABLET ORAL 2 TIMES DAILY WITH MEALS
COMMUNITY
Start: 2023-09-26 | End: 2024-01-18 | Stop reason: ALTCHOICE

## 2024-01-18 RX ORDER — ALBUTEROL SULFATE 90 UG/1
1-2 AEROSOL, METERED RESPIRATORY (INHALATION)
COMMUNITY
Start: 2020-04-25 | End: 2024-01-18 | Stop reason: ALTCHOICE

## 2024-01-18 RX ORDER — QUETIAPINE FUMARATE 100 MG/1
150 TABLET, FILM COATED ORAL NIGHTLY
COMMUNITY
Start: 2022-05-21 | End: 2024-01-18

## 2024-01-18 RX ORDER — NEEDLES, DISPOSABLE 25GX5/8"
NEEDLE, DISPOSABLE MISCELLANEOUS
COMMUNITY
Start: 2023-11-06

## 2024-01-18 RX ORDER — ASENAPINE MALEATE 2.5 MG/1
2.5 TABLET SUBLINGUAL NIGHTLY
COMMUNITY
End: 2024-01-18 | Stop reason: ALTCHOICE

## 2024-01-18 RX ORDER — TRAMADOL HYDROCHLORIDE 50 MG/1
50 TABLET ORAL
COMMUNITY
Start: 2019-12-17 | End: 2024-01-18 | Stop reason: ALTCHOICE

## 2024-01-18 RX ORDER — EPINEPHRINE 0.3 MG/.3ML
0.3 INJECTION SUBCUTANEOUS
COMMUNITY
Start: 2019-09-06

## 2024-01-18 RX ORDER — METHYLPREDNISOLONE 4 MG/1
1 TABLET ORAL AS DIRECTED
COMMUNITY
Start: 2022-06-02 | End: 2024-01-18 | Stop reason: ALTCHOICE

## 2024-01-18 RX ORDER — DIAZEPAM 5 MG/1
5 TABLET ORAL
COMMUNITY
End: 2024-01-18 | Stop reason: ALTCHOICE

## 2024-01-23 ENCOUNTER — PATIENT MESSAGE (OUTPATIENT)
Dept: FAMILY MEDICINE CLINIC | Facility: CLINIC | Age: 29
End: 2024-01-23

## 2024-01-23 DIAGNOSIS — G43.809 OTHER MIGRAINE WITHOUT STATUS MIGRAINOSUS, NOT INTRACTABLE: Primary | ICD-10-CM

## 2024-01-24 RX ORDER — RIMEGEPANT SULFATE 75 MG/75MG
TABLET, ORALLY DISINTEGRATING ORAL
Qty: 8 TABLET | Refills: 11 | Status: SHIPPED | OUTPATIENT
Start: 2024-01-24

## 2024-01-24 RX ORDER — RIMEGEPANT SULFATE 75 MG/75MG
TABLET, ORALLY DISINTEGRATING ORAL
Qty: 8 TABLET | Refills: 5 | Status: SHIPPED | OUTPATIENT
Start: 2024-01-24 | End: 2024-01-24

## 2024-01-24 NOTE — TELEPHONE ENCOUNTER
From: Sunday Jo  To: Joanne Fang  Sent: 1/23/2024 7:07 PM CST  Subject: Update     Just wanted to let you know that the sample medication you gave me for my migraines is working! I am having a lot more success with it than the other one.

## 2024-01-28 PROBLEM — M62.81 MUSCLE WEAKNESS: Status: RESOLVED | Noted: 2022-10-12 | Resolved: 2024-01-28

## 2024-01-28 PROBLEM — K52.9 GASTROENTERITIS: Status: RESOLVED | Noted: 2018-06-04 | Resolved: 2024-01-28

## 2024-01-28 PROBLEM — M62.89 MUSCLE TIGHTNESS: Status: RESOLVED | Noted: 2022-10-12 | Resolved: 2024-01-28

## 2024-01-28 PROBLEM — R10.2 VAGINAL PAIN: Status: RESOLVED | Noted: 2022-10-12 | Resolved: 2024-01-28

## 2024-01-28 PROBLEM — R10.2 PELVIC PAIN: Status: RESOLVED | Noted: 2019-12-16 | Resolved: 2024-01-28

## 2024-01-28 PROBLEM — J40 BRONCHITIS: Status: RESOLVED | Noted: 2020-04-25 | Resolved: 2024-01-28

## 2024-01-28 NOTE — PROGRESS NOTES
CHIEF COMPLAINT:   Chief Complaint   Patient presents with    Migraine         HPI:     Sunday Jo is a 28 year old adult presents for establishing care.    Sunday is a 29 yo patient here to establish care.    Migraine: has had migraines since age 8.  Has migraines in intervals where will have bad migraines for months, then can go years without migraines. The migraines usually are in the front, wrapping around the crown of the head. He cannot determine a pattern in the migraines.  Had gone to the ED in 9/2023, was having a pulsing HA, feeling \"Fuzzy.\" Couldn't see out of left eye when the migraine \"was in full force.\" Has +N/V, has to take Zofran for this.  Also has dizziness.  Has been on Sumatriptan, but feels this doesn't work every time and when it does , it is short-lived. Stress levels are ok- is working full-time at [employer] and also in school.  He notices migraines are better with water.  He sees the eye doctor regularly, lenses are UTD.  He gets migraines 4-5x/week.      Pulm nodule: was noted on CT from previous ED visit to have grounglass opacities vs pulm nodule (viewed results on [employer] on his phone) Currently has no cough, no CP, no F/C, no SOB.              HISTORY:  Past Medical History:   Diagnosis Date    Anxiety     Bipolar disorder (HCC)     Celiac disease     Depression     Sphincter of Oddi dysfunction       Past Surgical History:   Procedure Laterality Date    BILE DUCT ENDOSCOPY,INTRAOPERATIVE      had a stent put in at age 20    HC IMPLANT EAR TUBES      age 4    HYSTERECTOMY  2018    complete hysterectomy    LAPAROSCOPIC CHOLECYSTECTOMY      age 14    MASTECTOMY      d/t transgender, age 21    REMOVAL ADENOIDS,PRIMARY,12+ Y/O      age 10    TONSILLECTOMY      age 10      Family History   Problem Relation Age of Onset    Schizophrenia Father     Depression Father     ADHD Mother     Anxiety Mother     Depression Mother     Hypertension Mother     Diabetes Mother     Anxiety  Maternal Grandmother     Heart Disorder Maternal Grandmother     Heart Disorder Maternal Grandfather     Anxiety Sister     Schizophrenia Sister     PTSD Sister     Anxiety Sister     Depression Sister     Depression Sister     Anxiety Sister     OCD Sister       Social History:   Social History     Socioeconomic History    Marital status: Single   Tobacco Use    Smoking status: Every Day     Packs/day: 0.50     Years: 5.00     Additional pack years: 0.00     Total pack years: 2.50     Types: Cigarettes    Smokeless tobacco: Never   Vaping Use    Vaping Use: Never used   Substance and Sexual Activity    Alcohol use: Not Currently    Drug use: Not Currently     Types: Cocaine, Cannabis, benzodiazepines    Sexual activity: Yes     Partners: Female        Medications (Active prior to today's visit):  Current Outpatient Medications   Medication Sig Dispense Refill    acetaminophen 325 MG Oral Tab Take 2 tablets (650 mg total) by mouth.      butalbital-aspirin-caffeine -40 MG Oral Cap Take 1 capsule by mouth every 6 (six) hours as needed for Pain.      EPINEPHrine (EPIPEN 2-CURTIS) 0.3 MG/0.3ML Injection Solution Auto-injector Inject 0.3 mL (1 each total) into the muscle.      BD DISP NEEDLES 25G X 5/8\" Does not apply Misc USE 1 TIME WEEKLY FOR HRT INJECTION      ondansetron 4 MG Oral Tablet Dispersible PLACE 1 TABLET IN MOUTH EVERY 8 HOURS AS NEEDED FOR NAUSEA 12 tablet 3    DULoxetine 30 MG Oral Cap DR Particles Take 3 capsules (90 mg total) by mouth daily. 90 capsule 1    gabapentin 400 MG Oral Cap Take 1 capsule (400 mg total) by mouth 3 (three) times daily. 90 capsule 1    hydrOXYzine Pamoate (VISTARIL) 50 MG Oral Cap Take 1 capsule (50 mg total) by mouth 2 (two) times daily as needed for Anxiety (or Insomnia). 90 capsule 1    lamoTRIgine (LAMICTAL) 100 MG Oral Tab Take 1 tablet (100 mg total) by mouth daily. 30 tablet 1    Testosterone Cypionate & Prop 200-20 MG/ML Intramuscular Solution Inject 0.3 mg into the  muscle once a week. Pt takes on Tuesday's       Rimegepant Sulfate (NURTEC) 75 MG Oral Tablet Dispersible Take 1 tab PO daily as needed prn headache.  NO more than 1 dose in 24 hrs. 8 tablet 11       Allergies:  Allergies   Allergen Reactions    Gluten Meal HIVES, DIARRHEA and NAUSEA ONLY    Wasp Venom Protein SHORTNESS OF BREATH    Codeine NAUSEA AND VOMITING    Hydrocodone-Acetaminophen NAUSEA AND VOMITING    Bee Venom RASH       PSFH elements reviewed from today and agreed except as otherwise stated in HPI.  ROS:     Review of Systems   Constitutional:  Negative for appetite change and fatigue.   Eyes:  Positive for visual disturbance.   Gastrointestinal:  Positive for nausea and vomiting.   Neurological:  Positive for dizziness and headaches. Negative for weakness and numbness.         Pertinent positives and negatives noted in the the HPI.    PHYSICAL EXAM:   /86 (BP Location: Left arm, Patient Position: Sitting, Cuff Size: large)   Pulse 117   Temp 100 °F (37.8 °C) (Temporal)   Resp 18   Wt 195 lb 6.4 oz (88.6 kg)   SpO2 97%   BMI 35.74 kg/m²   Vital signs reviewed.Appears stated age, well groomed.  Physical Exam  Vitals reviewed.   Constitutional:       Appearance: Normal appearance.   HENT:      Head: Normocephalic.   Eyes:      Extraocular Movements: Extraocular movements intact.      Conjunctiva/sclera: Conjunctivae normal.      Pupils: Pupils are equal, round, and reactive to light.   Cardiovascular:      Rate and Rhythm: Normal rate and regular rhythm.      Pulses: Normal pulses.      Heart sounds: Normal heart sounds.   Pulmonary:      Effort: Pulmonary effort is normal.      Breath sounds: Normal breath sounds.   Musculoskeletal:      Cervical back: Normal range of motion and neck supple.   Lymphadenopathy:      Cervical: No cervical adenopathy.   Skin:     General: Skin is warm and dry.   Neurological:      General: No focal deficit present.      Mental Status: Sunday Jo is  alert and oriented to person, place, and time.   Psychiatric:         Behavior: Behavior normal.          LABS     No visits with results within 2 Month(s) from this visit.   Latest known visit with results is:   Admission on 09/25/2023, Discharged on 09/25/2023   Component Date Value    Rapid SARS-CoV-2 by PCR 09/25/2023 Not Detected     POCT Rapid Strep 09/25/2023 Negative       REVIEWED THIS VISIT  ASSESSMENT/PLAN:   28 year old adult with    1. Other migraine without status migrainosus, not intractable  - has had migraines since age 8  - Sumatriptan not effective  - START samples of Nurtec 75 mg daily prn, R/B/SE of new med d/w pt  - is works well, then advised pt to send [facility] message and I will send in full script to pharmacy    - ondansetron 4 MG Oral Tablet Dispersible; PLACE 1 TABLET IN MOUTH EVERY 8 HOURS AS NEEDED FOR NAUSEA  Dispense: 12 tablet; Refill: 3    2. Pulmonary nodule  - as seen on imaging on recent ED visit, reviewed report with pt on MyChart from external facility    - CT CHEST (CPT=71250); Future    3. Nausea  - Zofran prn migraines    - ondansetron 4 MG Oral Tablet Dispersible; PLACE 1 TABLET IN MOUTH EVERY 8 HOURS AS NEEDED FOR NAUSEA  Dispense: 12 tablet; Refill: 3    4. Tobacco dependence  - smoking cessation d/w pt, not ready to quit yet  - 1/2 PPD x 5 yrs      Meds This Visit:  Requested Prescriptions     Signed Prescriptions Disp Refills    ondansetron 4 MG Oral Tablet Dispersible 12 tablet 3     Sig: PLACE 1 TABLET IN MOUTH EVERY 8 HOURS AS NEEDED FOR NAUSEA       Health Maintenance:  Health Maintenance   Topic Date Due    Annual Physical  Never done    Pneumococcal Vaccine: Birth to 64yrs (1 of 2 - PCV) 01/18/2025 (Originally 12/20/2001)    COVID-19 Vaccine (4 - 2023-24 season) 01/18/2025 (Originally 9/1/2023)    Tobacco Cessation Counseling 1 Year  01/18/2025    DTaP,Tdap,and Td Vaccines (2 - Td or Tdap) 05/31/2033    Influenza Vaccine  Completed    Annual Depression Screening   Completed         Patient/Caregiver Education: There are no barriers to learning. Medical education done.   Outcome: Patient verbalizes understanding and agrees with plan. Advised to call or RTC if symptoms persist or worsen.    Problem List:     Patient Active Problem List   Diagnosis    Bipolar disorder (HCC)    Anxiety disorder, unspecified    Eating disorder    Anorexia nervosa, restricting type, severe    Celiac disease    Sphincter of Oddi dysfunction    Female-to-male transgender person    Allergic rhinitis    Bronchitis    Dermatographia    Elevated LFTs    Endometriosis    Esophageal reflux    Gastroenteritis    Migraine without aura    Muscle tightness    Muscle weakness    Pelvic pain    Seizure (HCC)    Vaginal pain       Imaging & Referrals:  CT CHEST (CPT=71250)     1/28/2024  Joanne Fang MD      Patient understands plan and follow-up.  Return for annual physical overdue.

## 2024-02-07 ENCOUNTER — TELEPHONE (OUTPATIENT)
Dept: FAMILY MEDICINE CLINIC | Facility: CLINIC | Age: 29
End: 2024-02-07

## 2024-02-07 NOTE — TELEPHONE ENCOUNTER
Joaquim called, asking for status on PA for [facility].    PA started via [facility] website. Waiting for response.

## 2024-02-12 ENCOUNTER — APPOINTMENT (OUTPATIENT)
Dept: GENERAL RADIOLOGY | Age: 29
End: 2024-02-12
Attending: NURSE PRACTITIONER
Payer: COMMERCIAL

## 2024-02-12 ENCOUNTER — HOSPITAL ENCOUNTER (OUTPATIENT)
Age: 29
Discharge: HOME | End: 2024-02-12
Payer: COMMERCIAL

## 2024-02-12 VITALS
SYSTOLIC BLOOD PRESSURE: 117 MMHG | TEMPERATURE: 98 F | HEART RATE: 70 BPM | RESPIRATION RATE: 16 BRPM | DIASTOLIC BLOOD PRESSURE: 86 MMHG | OXYGEN SATURATION: 99 %

## 2024-02-12 DIAGNOSIS — R07.9 ACUTE CHEST PAIN: Primary | ICD-10-CM

## 2024-02-12 LAB
#MXD IC: 0.7 X10ˆ3/UL (ref 0.1–1)
BUN BLD-MCNC: 10 MG/DL (ref 7–18)
CHLORIDE BLD-SCNC: 104 MMOL/L (ref 98–112)
CO2 BLD-SCNC: 24 MMOL/L (ref 21–32)
CREAT BLD-MCNC: 0.8 MG/DL
DDIMER WHOLE BLOOD: <200 NG/ML DDU (ref ?–400)
EGFRCR SERPLBLD CKD-EPI 2021: 122 ML/MIN/1.73M2 (ref 60–?)
GLUCOSE BLD-MCNC: 81 MG/DL (ref 70–99)
HCT VFR BLD AUTO: 49.2 %
HCT VFR BLD CALC: 48 %
HGB BLD-MCNC: 16.5 G/DL
ISTAT IONIZED CALCIUM FOR CHEM 8: 1.15 MMOL/L (ref 1.12–1.32)
LYMPHOCYTES # BLD AUTO: 2.3 X10ˆ3/UL (ref 1–4)
LYMPHOCYTES NFR BLD AUTO: 32.3 %
MCH RBC QN AUTO: 29.7 PG (ref 26–34)
MCHC RBC AUTO-ENTMCNC: 33.5 G/DL (ref 31–37)
MCV RBC AUTO: 88.6 FL (ref 80–100)
MIXED CELL %: 10.4 %
NEUTROPHILS # BLD AUTO: 4.2 X10ˆ3/UL (ref 1.5–7.7)
NEUTROPHILS NFR BLD AUTO: 57.3 %
PLATELET # BLD AUTO: 221 X10ˆ3/UL (ref 150–450)
POTASSIUM BLD-SCNC: 3.8 MMOL/L (ref 3.6–5.1)
RBC # BLD AUTO: 5.55 X10ˆ6/UL
SARS-COV-2 RNA RESP QL NAA+PROBE: NOT DETECTED
SODIUM BLD-SCNC: 143 MMOL/L (ref 136–145)
TROPONIN I BLD-MCNC: <0.02 NG/ML
WBC # BLD AUTO: 7.2 X10ˆ3/UL (ref 4–11)

## 2024-02-12 PROCEDURE — U0002 COVID-19 LAB TEST NON-CDC: HCPCS | Performed by: NURSE PRACTITIONER

## 2024-02-12 PROCEDURE — 84484 ASSAY OF TROPONIN QUANT: CPT | Performed by: NURSE PRACTITIONER

## 2024-02-12 PROCEDURE — 99214 OFFICE O/P EST MOD 30 MIN: CPT | Performed by: NURSE PRACTITIONER

## 2024-02-12 PROCEDURE — 71046 X-RAY EXAM CHEST 2 VIEWS: CPT | Performed by: NURSE PRACTITIONER

## 2024-02-12 PROCEDURE — 80047 BASIC METABLC PNL IONIZED CA: CPT | Performed by: NURSE PRACTITIONER

## 2024-02-12 PROCEDURE — 96372 THER/PROPH/DIAG INJ SC/IM: CPT | Performed by: NURSE PRACTITIONER

## 2024-02-12 PROCEDURE — 85025 COMPLETE CBC W/AUTO DIFF WBC: CPT | Performed by: NURSE PRACTITIONER

## 2024-02-12 PROCEDURE — 85378 FIBRIN DEGRADE SEMIQUANT: CPT | Performed by: NURSE PRACTITIONER

## 2024-02-12 PROCEDURE — 93000 ELECTROCARDIOGRAM COMPLETE: CPT | Performed by: NURSE PRACTITIONER

## 2024-02-12 RX ORDER — KETOROLAC TROMETHAMINE 30 MG/ML
15 INJECTION, SOLUTION INTRAMUSCULAR; INTRAVENOUS ONCE
Status: COMPLETED | OUTPATIENT
Start: 2024-02-12 | End: 2024-02-12

## 2024-02-12 RX ORDER — KETOROLAC TROMETHAMINE 30 MG/ML
15 INJECTION, SOLUTION INTRAMUSCULAR; INTRAVENOUS ONCE
Status: DISCONTINUED | OUTPATIENT
Start: 2024-02-12 | End: 2024-02-12

## 2024-02-12 NOTE — ED INITIAL ASSESSMENT (HPI)
Pt sts chest pain/tightness and intermittent heart racing for the past 3 days. Sts had a lot of caffeine (1000mg) 3 days ago.

## 2024-02-12 NOTE — DISCHARGE INSTRUCTIONS
Take Ibuprofen 600mg every 6 hours with food for pain    Follow up with your primary care provider in 2-5 days for reevaluation    Proceed to Emergency Department with any new, worsening or concerning symptoms

## 2024-02-12 NOTE — ED PROVIDER NOTES
History     Chief Complaint   Patient presents with    Chest Pain       Subjective:   HPI       Services WERE NOT used for this encounter  History provided by patient    Sunday Jo, 28 year old adult, born female, identified as male (on testosterone therapy x 20 years) presents for evaluation of chest pain. Patient states three days ago had to energy drinks, a large iced coffee, and regular coffee and shortly after started having pain and tightness to his left chest.  States pain has been consistent since.  Pain is worse with with deep inspiration.  Reports has had intermittent palpitations.  Denies any shortness of breath lightheadedness dizziness, no diaphoresis.  Denies any leg pain or swelling.  Denies any long cards or travel.  Denies recent immobilization.  Denies any history of blood clots.  Denies any recent falls injury or trauma.  States recently told he had a nodule on his chest, needs a CT scan for further evaluation of this that is scheduled for later this week. Smokes cigarettes, has intermittent cough but not worsening, no fever or chills. Denies recent cold symptoms.         Objective:   Past Medical History:   Diagnosis Date    Anxiety     Bipolar disorder (HCC)     Celiac disease     Depression     Sphincter of Oddi dysfunction               Past Surgical History:   Procedure Laterality Date    BILE DUCT ENDOSCOPY,INTRAOPERATIVE      had a stent put in at age 20    HC IMPLANT EAR TUBES      age 4    HYSTERECTOMY  2018    complete hysterectomy    LAPAROSCOPIC CHOLECYSTECTOMY      age 14    MASTECTOMY      d/t transgender, age 21    REMOVAL ADENOIDS,PRIMARY,12+ Y/O      age 10    TONSILLECTOMY      age 10                No pertinent social history.            Review of Systems   Constitutional:  Negative for chills and fever.   Respiratory:  Positive for cough and chest tightness.    Cardiovascular:  Positive for chest pain. Negative for palpitations and leg swelling.   All  other systems reviewed and are negative.        Constitutional and vital signs reviewed.      All other systems reviewed and negative except as noted above.    I have reviewed the family history, social history, allergies, and outpatient medications.     History reviewed from EMR: Encounters, problem list, allergies, medications    Notable findings from chart review: n/a      Physical Exam     ED Triage Vitals [02/12/24 1406]   /86   Pulse 104   Resp 16   Temp 99.2 °F (37.3 °C)   Temp src Temporal   SpO2 100 %   O2 Device None (Room air)       Current:/86   Pulse 70   Temp 98.3 °F (36.8 °C) (Temporal)   Resp 16   SpO2 99%       Physical Exam  Vitals reviewed.   Constitutional:       General: Sunday Jo is not in acute distress.     Appearance: Normal appearance. Sunday Jo is not ill-appearing, toxic-appearing or diaphoretic.   HENT:      Right Ear: Tympanic membrane, ear canal and external ear normal.      Left Ear: Tympanic membrane, ear canal and external ear normal.      Nose: Nose normal.      Mouth/Throat:      Mouth: Mucous membranes are moist.   Eyes:      Extraocular Movements: Extraocular movements intact.      Conjunctiva/sclera: Conjunctivae normal.      Pupils: Pupils are equal, round, and reactive to light.   Cardiovascular:      Rate and Rhythm: Normal rate.      Pulses: Normal pulses.           Radial pulses are 2+ on the right side and 2+ on the left side.        Dorsalis pedis pulses are 2+ on the right side and 2+ on the left side.      Heart sounds: Normal heart sounds.      No friction rub.   Pulmonary:      Effort: Pulmonary effort is normal. No respiratory distress.      Breath sounds: Normal breath sounds. No decreased breath sounds.   Chest:      Chest wall: No mass, deformity, tenderness, crepitus or edema.   Abdominal:      General: Bowel sounds are normal. There is no distension.      Palpations: Abdomen is soft.      Tenderness: There is no abdominal  tenderness. There is no right CVA tenderness or left CVA tenderness.   Musculoskeletal:      Cervical back: Normal range of motion.      Right lower leg: No tenderness. No edema.      Left lower leg: No tenderness. No edema.   Skin:     General: Skin is warm and dry.      Findings: No rash.   Neurological:      General: No focal deficit present.      Mental Status: Sunday Jo is alert and oriented to person, place, and time. Mental status is at baseline.   Psychiatric:         Mood and Affect: Mood normal.            ED Course     Labs Reviewed   D-DIMER (POC) - Normal   ISTAT TROPONIN - Normal   RAPID SARS-COV-2 BY PCR - Normal   POCT CBC   POCT ISTAT CHEM8 CARTRIDGE     Results for orders placed or performed during the hospital encounter of 02/12/24   POCT CBC   Result Value Ref Range    WBC IC 7.2 4.0 - 11.0 x10ˆ3/uL    RBC IC 5.55 Male 4.30-5.70 : Female 3.80-5.30 X10ˆ6/uL    HGB IC 16.5 Male 13.0-17.5 : Female 12.0-16.0 g/dL    HCT IC 49.2 Male 39.0-53.0 : Female 35.0-48.0 %    MCV IC 88.6 80.0 - 100.0 fL    MCH IC 29.7 26.0 - 34.0 pg    MCHC IC 33.5 31.0 - 37.0 g/dL    PLT .0 150.0 - 450.0 X10ˆ3/uL    # Neutrophil 4.2 1.5 - 7.7 X10ˆ3/uL    # Lymphocyte 2.3 1.0 - 4.0 X10ˆ3/uL    # Mixed Cells 0.7 0.1 - 1.0 X10ˆ3/uL    Neutrophil % 57.3 %    Lymphocyte % 32.3 %    Mixed Cell % 10.4 %   D-Dimer,Triage   Result Value Ref Range    D-Dimer DDU <200 <400 ng/mL DDU   EKG 12 Lead   Result Value Ref Range    Ventricular rate 104 BPM    Atrial rate 104 BPM    P-R Interval 140 ms    QRS Duration 72 ms    Q-T Interval 332 ms    QTC Calculation (Bezet) 436 ms    P Axis 48 degrees    R Axis 20 degrees    T Axis 34 degrees   POCT ISTAT chem8 cartridge   Result Value Ref Range    ISTAT Sodium 143 136 - 145 mmol/L    ISTAT BUN 10 7 - 18 mg/dL    ISTAT Potassium 3.8 3.6 - 5.1 mmol/L    ISTAT Chloride 104 98 - 112 mmol/L    ISTAT Ionized Calcium 1.15 1.12 - 1.32 mmol/L    ISTAT Hematocrit 48 Male 37-53 : Female  34-50 %    ISTAT Glucose 81 70 - 99 mg/dL    ISTAT TCO2 24 21 - 32 mmol/L    ISTAT Creatinine 0.80 Male 0.70-1.30 : Female 0.55-1.02 mg/dL    eGFR-Cr 122 >=60 mL/min/1.73m2   ISTAT Troponin   Result Value Ref Range    ISTAT Troponin <0.02 <0.045 ng/mL ng/mL   Rapid SARS-CoV-2 by PCR    Specimen: Nares; Other   Result Value Ref Range    Rapid SARS-CoV-2 by PCR Not Detected Not Detected                 MDM               Medical Decision Making  28-year-old transgender male presenting for evaluation of chest pain and tightness for 3 days as described above.  He denies fever chills cough URI symptoms, no shortness of breath lightheadedness dizziness weakness leg pain or swelling.  No episodes of diaphoresis.  Pain is worse with deep inspiration.  Headache or neck pain.  No abdominal pain nausea vomiting constipation diarrhea.  Intermittent sensation of palpitations.  Symptoms started after ingesting a large amount of caffeine from 3 days ago.  On exam patient is alert oriented and nontoxic-appearing, he has no increased work of breathing, his vital signs are within normal limits.  On arrival he is mildly tachycardic at 105..  Sounds are normal and lungs are clear to auscultation bilaterally strong distal pulses x 4 extremities.  No lower extremity swelling, edema, tenderness, erythema.  Ddx bronchitis vs pneumonia vs musculoskeletal vs pleurisy vs costochondritis vs anxiety vs acs vs PE vs other  Most of above differentials can be eliminated with hx and physical.   Covid, cxr,cbc,chem,trop,dimer ordered  Toradol for pain, awaiting results.     CBC, chemistry unremarkable.  Troponin negative.  D-dimer is negative.  COVID is negative.  EKG showing sinus tachycardia, rate is 104.  No ST elevation noted.  J-point in V2 V3.  Low suspicion that chest discomfort is related to any acute cardiopulmonary process.  Pleurisy versus costochondritis versus anxiety are still considered. Pain resolved after toradol.   Trial NSAIDs,  humidifier in room, close with PCP.  Patient is in agreement with above plan verbalized understanding of discharge            Disposition and Plan     Clinical Impression:  1. Acute chest pain         Disposition:  Discharge  2/12/2024  4:05 pm    Follow-up:  No follow-up provider specified.        Medications Prescribed:  Discharge Medication List as of 2/12/2024  4:11 PM

## 2024-02-13 LAB
ATRIAL RATE: 104 BPM
P AXIS: 48 DEGREES
P-R INTERVAL: 140 MS
Q-T INTERVAL: 332 MS
QRS DURATION: 72 MS
QTC CALCULATION (BEZET): 436 MS
R AXIS: 20 DEGREES
T AXIS: 34 DEGREES
VENTRICULAR RATE: 104 BPM

## 2024-02-18 ENCOUNTER — TELEPHONE (OUTPATIENT)
Dept: INTERNAL MEDICINE CLINIC | Facility: HOSPITAL | Age: 29
End: 2024-02-18

## 2024-02-18 NOTE — TELEPHONE ENCOUNTER
Outside Covid Testing done    Results and RTW guidelines:    COVID RESULT reported:      Test type:    [x] Rapid outside         [] PCR outside       [] Home Test    Date of test: 2/17     Test location: [facility]         [] Result viewed in Epic with verbal consent received from employee     [] Results per Employee Covid Dashboard    [x] Employee instructed to email copy of result to [email or web address]       [x] Discussed with employee     [] Unable to reach by phone.  Sent via [employer] [place] message    [x] Positive    - Employee should quarantine at home for at least 5 days (day 1 is day after sx onset) , follow the CDC guidelines for cleaning and                              quarantining; see CDC.gov   -This employee may RTW on day 6 if asymptomatic or mildly symptomatic (with improving symptoms).  Call Employee Health on day 5 if unable to return on                      day 6 after symptom onset.    -This employee needs to call Employee Health on day 5 after symptom onset.  The employee needs to be cleared by Employee Health.  - If Employee is still experiencing severe symptoms must make a RTW appt with Employee Health, Employee will not be cleared if:    1. Has consistent cough, shortness of breath or fatigue that restricts your physical activities    2. Is still feeling \"unwell\"    3. Within 15 days of hospitalization for COVID    4. Within 20 days of intubation for COVID    5. Still has a fever, vomiting or diarrhea   - Keep communication open with management about RTW and if symptoms worsen    - Monitor sx and temperature    - Employee should quarantine at home for at least 5 days from sx onset, follow the CDC guidelines for cleaning and quarantining; see CDC.gov                  - Notify PCP of result                 - Seek emergent care with worsening symptoms        Notes:     RTW PLAN:    [x]  If COVID positive results, off work minimum of 5 days from positive test or onset of symptoms (day 0)        On day  5, if asymptomatic or mildly symptomatic (with improving symptoms) may return to work day 6          On day 5, if symptomatic, call Employee Health for RTW screening        []  COVID positive result - call Employee Health on day 5 after symptom onset.  The employee needs to be cleared by Employee Health to RTW.  [] RTW immediately, continue to monitor for sx  [] RTW when sx improve; must be fever free for 24 hours w/o medications, Diarrhea/Vomiting free for 24 hours w/o medications  [] [facility] ordered; continue to monitor sx and call for new/worsening sx.  Discuss RTW guidelines with manager             [] Rapid ordered to confirm home negative.   [] May continue to work  [] Follow up with PCP  [] Home until further instruction from hotline with [facility] results  INSTRUCTIONS PROVIDED:  [x]  Plan as noted above  []  Length of time to obtain results  []  May return to work if views negative in My Chart and  remains fever, vomiting, and diarrhea free  []  May continue to work if remains asymptomatic   [x]  Quarantine instructions  [x]  Masking protocol  [x]  S/S of worsening infection/condition and importance of prompt medical re-evaluation including when to seek emergency care  [] If symptoms develop, stay home and call hotline for rapid test order    Estimated RTW date:  2/20  [x] Manager notified        [] TESSY  [x][facility]   [] [facility]  Manager : Miriam Polo    [x] Direct Patient Care  []Indirect Patient Contact   [] Non-Clinical/No Patient Contact    For Direct Patient Care ONLY: Have you been fitted with an N95 mask? [x] Yes  []No      HAVE YOU RECEIVED THE COVID-19 Vaccine? Yes [x]    No []          If yes, date(s) received:  4/3/21; 4/30/21; 12/19/21          Which vaccine:  Pfizer [x]     Moderna []    J&J []      SYMPTOMS (reported via dashboard):  [] asymptomatic  [x] symptomatic  [] GI symptoms only    Symptom onset date: 2/14    Fever   > 100F             Yes [x]      Cough                          Yes []       Shortness of breath  Yes [x]      Congestion                 Yes [x]      Runny nose                Yes []        Loss of Smell              Yes []        Loss of Taste             Yes []       Sore throat                 Yes []       Fatigue                        Yes []       Body Aches                Yes []        Chills                           Yes []        Headache                   Yes []             GI symptoms             Yes [x]     No []                     Nausea   []          Vomiting            [x]                                    Diarrhea  []          Upset stomach []      Employee reported COVID Exposure?  Yes []     No [x]    Date of exposure:   []  Coworker                       [] patient                        [] Family/friend    PPE:   [] N95 Mask/PAPR  [] Standard Mask  [] Eyewear  [] None    Within 6 feet for >15 minutes? [] Yes []  No    Is this a true exposure? []  Yes []  No    When was the last shift you worked?: 2/16    Employee has a history of Covid?  Yes []     No [x]   If Yes, when:    Notes:

## 2024-02-20 ENCOUNTER — TELEMEDICINE (OUTPATIENT)
Dept: FAMILY MEDICINE CLINIC | Facility: CLINIC | Age: 29
End: 2024-02-20
Payer: COMMERCIAL

## 2024-02-20 VITALS — TEMPERATURE: 98 F

## 2024-02-20 DIAGNOSIS — U07.1 COVID-19: Primary | ICD-10-CM

## 2024-02-20 PROBLEM — E46 PROTEIN DEFICIENCY DISEASE (HCC): Status: ACTIVE | Noted: 2020-08-25

## 2024-02-20 PROBLEM — B34.9 VIRAL INFECTION: Status: ACTIVE | Noted: 2024-02-17

## 2024-02-20 PROBLEM — T50.901A DRUG OVERDOSE: Status: ACTIVE | Noted: 2020-08-21

## 2024-02-20 PROBLEM — M54.9 CHRONIC BACK PAIN: Status: ACTIVE | Noted: 2022-05-18

## 2024-02-20 PROBLEM — F31.4 BIPOLAR 1 DISORDER, DEPRESSED, SEVERE (HCC): Status: ACTIVE | Noted: 2020-08-22

## 2024-02-20 PROBLEM — G89.29 CHRONIC BACK PAIN: Status: ACTIVE | Noted: 2022-05-18

## 2024-02-20 PROCEDURE — 99213 OFFICE O/P EST LOW 20 MIN: CPT | Performed by: FAMILY MEDICINE

## 2024-02-20 RX ORDER — MELOXICAM 15 MG/1
15 TABLET ORAL DAILY
COMMUNITY
Start: 2022-12-12

## 2024-02-20 RX ORDER — NAPROXEN 500 MG/1
500 TABLET ORAL 2 TIMES DAILY WITH MEALS
COMMUNITY
Start: 2023-09-26

## 2024-02-20 RX ORDER — IBUPROFEN 600 MG/1
1 TABLET ORAL EVERY 6 HOURS PRN
COMMUNITY
Start: 2023-03-06

## 2024-02-20 RX ORDER — TESTOSTERONE CYPIONATE 200 MG/ML
INJECTION, SOLUTION INTRAMUSCULAR
COMMUNITY
Start: 2024-02-12

## 2024-02-20 RX ORDER — QUETIAPINE FUMARATE 100 MG/1
TABLET, FILM COATED ORAL DAILY
COMMUNITY
Start: 2022-05-13

## 2024-02-20 RX ORDER — PRAZOSIN HYDROCHLORIDE 1 MG/1
CAPSULE ORAL
COMMUNITY
Start: 2023-08-07

## 2024-02-20 NOTE — PROGRESS NOTES
HPI:     Sunday Jo is a 28 year old adult presents for    Video visit with live contact using audio and visual was used for this encounter     Patient is C/O cough, congestion, sore throat, SOB and body ache for 4 days.  The next day went to [facility] ER mainly for GI symptoms including nausea and vomiting.  Did not have too many respiratory symptoms yet at that time.  Was put on antinausea medication and sent home.  Tested positive for COVID at the ER.  Since then has had worsening respiratory symptoms.  Is having a hard time breathing even ambulating from one room to the next.  Tmax has been 103.  This is since dissipated.  The GI symptoms are resolved.  Respiratory symptoms began yesterday.  Overall describes it feeling like they cannot get enough air.  Does feel like they are wheezing.  Is coughing a lot.        Medications (Active prior to today's visit):  Current Outpatient Medications   Medication Sig Dispense Refill    DULoxetine (CYMBALTA) 60 MG Oral Cap DR Particles Take 1 capsule (60 mg total) by mouth daily. 30 capsule 1    lamoTRIgine (LAMICTAL) 100 MG Oral Tab Take 1 tablet (100 mg total) by mouth daily. 30 tablet 1    Rimegepant Sulfate (NURTEC) 75 MG Oral Tablet Dispersible Take 1 tab PO daily as needed prn headache.  NO more than 1 dose in 24 hrs. 8 tablet 11    acetaminophen 325 MG Oral Tab Take 2 tablets (650 mg total) by mouth.      butalbital-aspirin-caffeine -40 MG Oral Cap Take 1 capsule by mouth every 6 (six) hours as needed for Pain.      EPINEPHrine (EPIPEN 2-CURTIS) 0.3 MG/0.3ML Injection Solution Auto-injector Inject 0.3 mL (1 each total) into the muscle.      BD DISP NEEDLES 25G X 5/8\" Does not apply Misc USE 1 TIME WEEKLY FOR HRT INJECTION      gabapentin 400 MG Oral Cap Take 1 capsule (400 mg total) by mouth 3 (three) times daily. 90 capsule 1    Testosterone Cypionate & Prop 200-20 MG/ML Intramuscular Solution Inject 0.3 mg into the muscle once a week. Pt takes on  Tuesday's       hydrOXYzine Pamoate (VISTARIL) 50 MG Oral Cap Take 1 capsule (50 mg total) by mouth 2 (two) times daily as needed for Anxiety (or Insomnia). (Patient not taking: Reported on 2/12/2024) 90 capsule 1    ondansetron 4 MG Oral Tablet Dispersible PLACE 1 TABLET IN MOUTH EVERY 8 HOURS AS NEEDED FOR NAUSEA (Patient not taking: Reported on 2/20/2024) 12 tablet 3       Allergies:  Allergies   Allergen Reactions    Gluten Meal HIVES, DIARRHEA and NAUSEA ONLY    Wasp Venom Protein SHORTNESS OF BREATH    Codeine NAUSEA AND VOMITING    Hydrocodone-Acetaminophen NAUSEA AND VOMITING    Bee Venom RASH       PSFH elements reviewed from today and agreed except as otherwise stated in HPI.  ROS:      Pertinent positives and negatives noted in the the HPI.    PHYSICAL EXAM:     Vitals:    02/20/24 1130   Temp: 98 °F (36.7 °C)   TempSrc: Temporal     Vital signs reviewed.Appears stated age, well groomed.  Physical Exam  Constitutional:       Appearance: Sunday Jo is ill-appearing.      Comments: Unable to fully assess-evisit    Neurological:      Mental Status: Sunday Jo is alert.   Psychiatric:         Mood and Affect: Mood normal.         Speech: Speech normal.         Behavior: Behavior normal.      Frequently coughs and seems conversationally dyspneic     ASSESSMENT/PLAN:   28 year old adult with    1. COVID-19      Discussed natural progression of COVID-19 infection.  With the severity of patient's symptoms and they are persistent cough and difficulties breathing, do think the best place for patient to have an evaluation would be in the nearest emergency room.  Patient was very hesitant about this idea.  Did explain they can certainly try to go to the urgent care first they very well may see since over to the emergency room based on findings from urgent care.  Can take OTC Tylenol for now.  Follow-up with PCP as previously recommended by PCP      Patient/Caregiver Education: There are no  barriers to learning. Medical education done.   Outcome: Patient verbalizes understanding and agrees with plan. Advised to call or RTC if symptoms persist or worsen.    2/20/2024  Laquita Camacho, DO    Patient understands plan and follow-up.

## 2024-03-02 ENCOUNTER — HOSPITAL ENCOUNTER (OUTPATIENT)
Dept: CT IMAGING | Facility: HOSPITAL | Age: 29
Discharge: HOME | End: 2024-03-02
Attending: FAMILY MEDICINE
Payer: COMMERCIAL

## 2024-03-02 DIAGNOSIS — R91.1 PULMONARY NODULE: ICD-10-CM

## 2024-03-02 PROCEDURE — 71250 CT THORAX DX C-: CPT | Performed by: FAMILY MEDICINE

## 2024-03-03 DIAGNOSIS — J84.10 LUNG GRANULOMA (HCC): Primary | ICD-10-CM

## 2024-05-25 ENCOUNTER — HOSPITAL ENCOUNTER (OUTPATIENT)
Age: 29
Discharge: HOME | End: 2024-05-25

## 2024-05-25 VITALS
RESPIRATION RATE: 18 BRPM | TEMPERATURE: 98 F | DIASTOLIC BLOOD PRESSURE: 84 MMHG | SYSTOLIC BLOOD PRESSURE: 117 MMHG | HEART RATE: 87 BPM | OXYGEN SATURATION: 98 %

## 2024-05-25 DIAGNOSIS — R09.81 NASAL CONGESTION: Primary | ICD-10-CM

## 2024-05-25 DIAGNOSIS — G43.909 MIGRAINE WITHOUT STATUS MIGRAINOSUS, NOT INTRACTABLE, UNSPECIFIED MIGRAINE TYPE: ICD-10-CM

## 2024-05-25 LAB — SARS-COV-2 RNA RESP QL NAA+PROBE: NOT DETECTED

## 2024-05-25 RX ORDER — KETOROLAC TROMETHAMINE 30 MG/ML
30 INJECTION, SOLUTION INTRAMUSCULAR; INTRAVENOUS ONCE
Status: COMPLETED | OUTPATIENT
Start: 2024-05-25 | End: 2024-05-25

## 2024-05-25 RX ORDER — ONDANSETRON 4 MG/1
4 TABLET, ORALLY DISINTEGRATING ORAL ONCE
Status: COMPLETED | OUTPATIENT
Start: 2024-05-25 | End: 2024-05-25

## 2024-05-25 RX ORDER — KETOROLAC TROMETHAMINE 30 MG/ML
30 INJECTION, SOLUTION INTRAMUSCULAR; INTRAVENOUS ONCE
Status: DISCONTINUED | OUTPATIENT
Start: 2024-05-25 | End: 2024-05-25

## 2024-05-25 NOTE — ED INITIAL ASSESSMENT (HPI)
Pt c/o headache and nasal congestion, concerned may have COVID, was exposed at work and by family member

## 2024-05-25 NOTE — DISCHARGE INSTRUCTIONS
Push fluids  Migraine medication as previously prescribed.   PCP follow up if not improving in 1 day.     If any visual loss, worsening symptoms not controlled with medication please go to ED.

## 2024-05-25 NOTE — ED PROVIDER NOTES
Patient Seen in: [facility]      History     Chief Complaint   Patient presents with    Cough/URI    Headache     Stated Complaint: Bad migraine, nausea, fatigue    Subjective:   27 yo patient presents with complaint of headache and nasal congestion that began 2 days ago.   Pt has history of migraines.   Also was exposed to COVID by family member.  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   Pt denies vomiting, fever, chills, neck pain, abdominal pain, weakness, visual changes, gait changes.           The history is provided by the patient.           Objective:   Past Medical History:    Allergic rhinitis    Anxiety    Bipolar disorder (HCC)    Celiac disease (HCC)    Depression    Obesity    Sphincter of Oddi dysfunction              Past Surgical History:   Procedure Laterality Date    Adenoidectomy      Same time as Tonsils    Bile duct endoscopy,intraoperative      had a stent put in at age 20    Cholecystectomy  2009    Colonoscopy  2012    Hc implant ear tubes      age 4    Hysterectomy  2018    complete hysterectomy    Laparoscopic cholecystectomy      age 14    Mastectomy      d/t transgender, age 21    Other surgical history  2016    Double mastectomy w/ nipple grafts    Removal adenoids,primary,12+ y/o      age 10    Tonsillectomy      age 10                Social History     Socioeconomic History    Marital status: Single   Tobacco Use    Smoking status: Every Day     Current packs/day: 0.50     Average packs/day: 0.5 packs/day for 14.0 years (7.0 ttl pk-yrs)     Types: Cigarettes    Smokeless tobacco: Never    Tobacco comments:     I am trying to quit, was able to cut down to half a pack   Vaping Use    Vaping status: Never Used   Substance and Sexual Activity    Alcohol use: Yes     Alcohol/week: 2.0 - 4.0 standard drinks of alcohol     Types: 1 - 3 Glasses of wine, 1 Shots of liquor per week     Comment: It is very sporadic, I go months without touching it    Drug use: Not  Currently     Types: Cocaine, Cannabis, benzodiazepines     Comment: None    Sexual activity: Yes     Partners: Female   Other Topics Concern    Caffeine Concern No    Exercise No    Seat Belt No    Special Diet Yes     Comment: I am having weird reactions to food that I didn't before    Stress Concern Yes     Comment: A little bit when school is in session    Weight Concern Yes     Comment: I gained a significant amount of weight from previous meds              Review of Systems   Constitutional:  Negative for chills, diaphoresis and fever.   HENT:  Positive for congestion. Negative for sinus pressure.    Gastrointestinal:  Positive for nausea. Negative for diarrhea and vomiting.   Neurological:  Positive for headaches. Negative for dizziness, speech difficulty, weakness and numbness.       Positive for stated complaint: Bad migraine, nausea, fatigue  Other systems are as noted in HPI.  Constitutional and vital signs reviewed.      All other systems reviewed and negative except as noted above.    Physical Exam     ED Triage Vitals [05/25/24 0815]   /84   Pulse 87   Resp 18   Temp 97.7 °F (36.5 °C)   Temp src Oral   SpO2 98 %   O2 Device None (Room air)       Current Vitals:   Vital Signs  BP: 117/84  Pulse: 87  Resp: 18  Temp: 97.7 °F (36.5 °C)  Temp src: Oral    Oxygen Therapy  SpO2: 98 %  O2 Device: None (Room air)            Physical Exam  Constitutional:       Appearance: Normal appearance.   HENT:      Right Ear: Tympanic membrane normal.      Left Ear: Tympanic membrane normal.      Nose: Rhinorrhea present.      Mouth/Throat:      Mouth: Mucous membranes are moist.      Pharynx: Oropharynx is clear.   Eyes:      Extraocular Movements: Extraocular movements intact.      Conjunctiva/sclera: Conjunctivae normal.      Pupils: Pupils are equal, round, and reactive to light.   Cardiovascular:      Rate and Rhythm: Normal rate and regular rhythm.      Heart sounds: No murmur heard.  Pulmonary:      Effort:  Pulmonary effort is normal.      Breath sounds: Normal breath sounds.   Abdominal:      General: Abdomen is flat. Bowel sounds are normal.      Palpations: Abdomen is soft.      Tenderness: There is no abdominal tenderness.   Lymphadenopathy:      Cervical: No cervical adenopathy.   Skin:     General: Skin is warm and dry.   Neurological:      General: No focal deficit present.      Mental Status: Sunday is alert and oriented to person, place, and time.      Cranial Nerves: No dysarthria or facial asymmetry.      Sensory: Sensation is intact. No sensory deficit.      Motor: No weakness.      Coordination: Coordination normal.      Gait: Gait normal.   Psychiatric:         Mood and Affect: Mood normal.               ED Course     Labs Reviewed   RAPID SARS-COV-2 BY PCR - Normal                      MDM                                         Medical Decision Making  29 yo patient presents with headache, nausea and nasal congestion that began about 2 days ago.   Diff dx include migraine vs Covid vs viral illness vs other.   On exam, pt is non-toxic, vitals normal/afebrile. Neuro exam intact.   Zofran and Toradol given in clinic which significantly helped symptoms.  Has Zofran at home from previous prescription.   Push fluids, ice pack, rest, continue previously prescribed migraine medication if needed.   Recommend PCP follow up in 1 day if symptoms not resolved.   ED precautions discussed.      Amount and/or Complexity of Data Reviewed  External Data Reviewed: notes.    Risk  OTC drugs.        Disposition and Plan     Clinical Impression:  1. Nasal congestion    2. Migraine without status migrainosus, not intractable, unspecified migraine type         Disposition:  Discharge  5/25/2024  9:05 am    Follow-up:  Joanne Fang MD  [address]  [facility] [place]  [facility] [address]  [phone]    In 1 day  If symptoms worsen          Medications Prescribed:  Discharge Medication List as of 5/25/2024  9:09 AM

## 2024-07-26 ENCOUNTER — OFFICE VISIT (OUTPATIENT)
Dept: FAMILY MEDICINE CLINIC | Facility: CLINIC | Age: 29
End: 2024-07-26
Payer: COMMERCIAL

## 2024-07-26 VITALS
SYSTOLIC BLOOD PRESSURE: 126 MMHG | OXYGEN SATURATION: 96 % | DIASTOLIC BLOOD PRESSURE: 82 MMHG | BODY MASS INDEX: 37 KG/M2 | WEIGHT: 200.19 LBS | TEMPERATURE: 98 F | RESPIRATION RATE: 18 BRPM | HEART RATE: 102 BPM

## 2024-07-26 DIAGNOSIS — J01.00 ACUTE MAXILLARY SINUSITIS, RECURRENCE NOT SPECIFIED: Primary | ICD-10-CM

## 2024-07-26 DIAGNOSIS — H69.93 DISORDER OF BOTH EUSTACHIAN TUBES: ICD-10-CM

## 2024-07-26 DIAGNOSIS — G43.809 OTHER MIGRAINE WITHOUT STATUS MIGRAINOSUS, NOT INTRACTABLE: ICD-10-CM

## 2024-07-26 LAB
COVID19 BINAX NOW ANTIGEN: NOT DETECTED
OPERATOR ID: NORMAL
POCT LOT NUMBER: NORMAL

## 2024-07-26 PROCEDURE — 99214 OFFICE O/P EST MOD 30 MIN: CPT | Performed by: FAMILY MEDICINE

## 2024-07-26 RX ORDER — SYRINGE WITH NEEDLE, 1 ML 25GX5/8"
1 SYRINGE, EMPTY DISPOSABLE MISCELLANEOUS AS DIRECTED
COMMUNITY
Start: 2024-05-28

## 2024-07-26 RX ORDER — RIMEGEPANT SULFATE 75 MG/75MG
TABLET, ORALLY DISINTEGRATING ORAL
Qty: 8 TABLET | Refills: 11 | Status: SHIPPED | OUTPATIENT
Start: 2024-07-26

## 2024-07-26 RX ORDER — PREDNISONE 20 MG/1
TABLET ORAL
Qty: 10 TABLET | Refills: 0 | Status: SHIPPED | OUTPATIENT
Start: 2024-07-26

## 2024-07-26 NOTE — PROGRESS NOTES
CHIEF COMPLAINT:   Chief Complaint   Patient presents with    Ear Problem     Pain drainage          HPI:     Sunday Jo is a 28 year old adult presents for sinus problem, ear problem.    Sinus and ear problem:  pt has a 2 day history of right ear pain, that now progressed to congestion lastnight. He feels like he has \"gunk in my face.\" Has fatigue, but also moved furniture x 3 hrs yesterday.  Has no F/C, no bodyaches.  Was exposed to Covid 2 wks ago with outbreak at work (works at [employer]). Has been taking decongestant, alternating Tylenol/Ibuprofen, heating pad on right ear makes it feel better.  No recent travel.               HISTORY:  Past Medical History:    Allergic rhinitis    Anxiety    Bipolar disorder (HCC)    Celiac disease (HCC)    Depression    Obesity    Sphincter of Oddi dysfunction      Past Surgical History:   Procedure Laterality Date    Adenoidectomy      Same time as Tonsils    Bile duct endoscopy,intraoperative      had a stent put in at age 20    Cholecystectomy  2009    Colonoscopy  2012    Hc implant ear tubes      age 4    Hysterectomy  2018    complete hysterectomy    Laparoscopic cholecystectomy      age 14    Mastectomy      d/t transgender, age 21    Other surgical history  2016    Double mastectomy w/ nipple grafts    Removal adenoids,primary,12+ y/o      age 10    Tonsillectomy      age 10      Family History   Problem Relation Age of Onset    Schizophrenia Father     Depression Father     Obesity Father     Psychiatric Father     ADHD Mother     Anxiety Mother     Depression Mother     Hypertension Mother     Diabetes Mother     Obesity Mother     Psychiatric Mother     Stroke Mother     Anxiety Maternal Grandmother     Heart Disorder Maternal Grandmother     Hypertension Maternal Grandmother     Psychiatric Maternal Grandmother     Heart Disorder Maternal Grandfather     Cancer Maternal Grandfather     Hypertension Maternal Grandfather     Anxiety Sister     Schizophrenia  Sister     Asthma Sister     Depression Sister     Obesity Sister     Psychiatric Sister     PTSD Sister     Anxiety Sister     Depression Sister     Psychiatric Sister     Depression Sister     Anxiety Sister     OCD Sister     Anemia Sister     Obesity Sister     Psychiatric Sister     Heart Disorder Paternal Grandfather     Psychiatric Paternal Grandmother     Psychiatric Brother       Social History:   Social History     Socioeconomic History    Marital status: Single   Tobacco Use    Smoking status: Every Day     Current packs/day: 0.50     Average packs/day: 0.5 packs/day for 14.0 years (7.0 ttl pk-yrs)     Types: Cigarettes    Smokeless tobacco: Never    Tobacco comments:     I am trying to quit, was able to cut down to half a pack   Vaping Use    Vaping status: Never Used   Substance and Sexual Activity    Alcohol use: Yes     Alcohol/week: 2.0 - 4.0 standard drinks of alcohol     Types: 1 - 3 Glasses of wine, 1 Shots of liquor per week     Comment: It is very sporadic, I go months without touching it    Drug use: Not Currently     Types: Cocaine, Cannabis, benzodiazepines     Comment: None    Sexual activity: Yes     Partners: Female   Other Topics Concern    Caffeine Concern No    Exercise No    Seat Belt No    Special Diet Yes     Comment: I am having weird reactions to food that I didn't before    Stress Concern Yes     Comment: A little bit when school is in session    Weight Concern Yes     Comment: I gained a significant amount of weight from previous meds        Medications (Active prior to today's visit):  Current Outpatient Medications   Medication Sig Dispense Refill    BD LUER-ANIBAL SYRINGE 23G X 1\" 3 ML Does not apply Misc Take 1 Bottle by mouth As Directed.      DULoxetine (CYMBALTA) 60 MG Oral Cap DR Particles Take 1 capsule (60 mg total) by mouth daily. 30 capsule 2    gabapentin 400 MG Oral Cap Take 1 capsule (400 mg total) by mouth 3 (three) times daily. 90 capsule 2    lamoTRIgine (LAMICTAL)  100 MG Oral Tab Take 1 tablet (100 mg total) by mouth daily. 30 tablet 2    ibuprofen 600 MG Oral Tab Take 1 tablet (600 mg total) by mouth every 6 (six) hours as needed.      Meloxicam 15 MG Oral Tab Take 1 tablet (15 mg total) by mouth daily.      naproxen 500 MG Oral Tab Take 1 tablet (500 mg total) by mouth 2 (two) times daily with meals.      prazosin 1 MG Oral Cap TAKE 1 CAPSULE (1 MG TOTAL) WITH 1 CAPSULE (2 MG TOTAL) FOR A TOTAL OF 3 MG BY MOUTH NIGHTLY.      QUEtiapine 100 MG Oral Tab Take by mouth daily.      testosterone cypionate 200 mg/mL Intramuscular Solution INJECT 0.4ML INTO THE MUSCLE ONCE WEEKLY      Rimegepant Sulfate (NURTEC) 75 MG Oral Tablet Dispersible Take 1 tab PO daily as needed prn headache.  NO more than 1 dose in 24 hrs. 8 tablet 11    acetaminophen 325 MG Oral Tab Take 2 tablets (650 mg total) by mouth.      butalbital-aspirin-caffeine -40 MG Oral Cap Take 1 capsule by mouth every 6 (six) hours as needed for Pain.      EPINEPHrine (EPIPEN 2-CURTIS) 0.3 MG/0.3ML Injection Solution Auto-injector Inject 0.3 mL (1 each total) into the muscle.      BD DISP NEEDLES 25G X 5/8\" Does not apply Misc USE 1 TIME WEEKLY FOR HRT INJECTION      Testosterone Cypionate & Prop 200-20 MG/ML Intramuscular Solution Inject 0.3 mg into the muscle once a week. Pt takes on Tuesday's       hydrOXYzine Pamoate (VISTARIL) 50 MG Oral Cap Take 1 capsule (50 mg total) by mouth 2 (two) times daily as needed for Anxiety (or Insomnia). (Patient not taking: Reported on 2/12/2024) 90 capsule 1    ondansetron 4 MG Oral Tablet Dispersible PLACE 1 TABLET IN MOUTH EVERY 8 HOURS AS NEEDED FOR NAUSEA (Patient not taking: Reported on 2/20/2024) 12 tablet 3       Allergies:  Allergies   Allergen Reactions    Gluten Meal HIVES, DIARRHEA and NAUSEA ONLY    Wasp Venom Protein SHORTNESS OF BREATH    Codeine NAUSEA AND VOMITING    Hydrocodone-Acetaminophen NAUSEA AND VOMITING    Bee Venom RASH       PSFH elements reviewed from  today and agreed except as otherwise stated in HPI.  ROS:     Review of Systems   Constitutional:  Positive for fatigue. Negative for appetite change, chills and fever.   HENT:  Positive for congestion, ear pain, sinus pressure and sinus pain. Negative for sore throat.    Respiratory:  Negative for cough and shortness of breath.    Gastrointestinal:  Negative for abdominal pain, diarrhea and nausea.         Pertinent positives and negatives noted in the the HPI.    PHYSICAL EXAM:   BP (!) 130/100 (BP Location: Left arm, Patient Position: Sitting, Cuff Size: adult)   Pulse 102   Temp 98.4 °F (36.9 °C) (Temporal)   Resp 18   Wt 200 lb 3.2 oz (90.8 kg)   SpO2 96%   BMI 36.62 kg/m²   Vital signs reviewed.Appears stated age, well groomed.  Physical Exam  Vitals reviewed.   Constitutional:       Appearance: Normal appearance.   HENT:      Head: Normocephalic.      Right Ear: Ear canal and external ear normal.      Left Ear: Ear canal and external ear normal.      Ears:      Comments: TM opacity bilaterally , no erytehma, not bulging  Eyes:      Conjunctiva/sclera: Conjunctivae normal.   Cardiovascular:      Rate and Rhythm: Normal rate and regular rhythm.      Pulses: Normal pulses.      Heart sounds: Normal heart sounds.   Pulmonary:      Effort: Pulmonary effort is normal. No respiratory distress.      Breath sounds: Normal breath sounds.   Musculoskeletal:      Cervical back: Normal range of motion and neck supple.   Lymphadenopathy:      Cervical: No cervical adenopathy.   Skin:     General: Skin is warm and dry.   Neurological:      Mental Status: Roman Catholic is alert and oriented to person, place, and time.   Psychiatric:         Behavior: Behavior normal.          LABS     No visits with results within 2 Month(s) from this visit.   Latest known visit with results is:   Admission on 05/25/2024, Discharged on 05/25/2024   Component Date Value    Rapid SARS-CoV-2 by PCR 05/25/2024 Not Detected       REVIEWED THIS  VISIT  ASSESSMENT/PLAN:   28 year old adult with    1. Acute maxillary sinusitis, recurrence not specified  - Rapid Covid: neg  - supportive care d/w pt  - CDC guidelines discussed with pt  - if sx worsen or persist, advised to f-u    - COVID19 BinaxNOW Antigen    2. Eustachian Tube Dysfunction  - start Prednisone x 5 days, R//B/SE of new med d/w pt  - cont decongestant daily prn    3. Migraine  - needs refill of Nurtec      Meds This Visit:  Requested Prescriptions      No prescriptions requested or ordered in this encounter       Health Maintenance:  Health Maintenance   Topic Date Due    Annual Physical  Never done    Tobacco Cessation Counseling  Never done    Pneumococcal Vaccine: Birth to 64yrs (1 of 2 - PCV) 01/18/2025 (Originally 12/20/2001)    COVID-19 Vaccine (4 - 2023-24 season) 01/18/2025 (Originally 9/1/2023)    Influenza Vaccine (1) 10/01/2024    DTaP,Tdap,and Td Vaccines (2 - Td or Tdap) 05/31/2033    Annual Depression Screening  Completed         Patient/Caregiver Education: There are no barriers to learning. Medical education done.   Outcome: Patient verbalizes understanding and agrees with plan. Advised to call or RTC if symptoms persist or worsen.    Problem List:     Patient Active Problem List   Diagnosis    Bipolar disorder (HCC)    Anxiety disorder, unspecified    Anorexia nervosa, restricting type, severe (HCC)    Celiac disease (HCC)    Sphincter of Oddi dysfunction    Female-to-male transgender person    Allergic rhinitis    Endometriosis    Esophageal reflux    Migraine    Seizure (HCC)    Viral infection    Protein deficiency disease (HCC)    Drug overdose    COVID-19 virus infection    Chronic back pain    Bipolar 1 disorder, depressed, severe (HCC)       Imaging & Referrals:  None     7/26/2024  Joanne Fang MD      Patient understands plan and follow-up.  No follow-ups on file.

## 2024-08-30 ENCOUNTER — TELEMEDICINE (OUTPATIENT)
Dept: FAMILY MEDICINE CLINIC | Facility: CLINIC | Age: 29
End: 2024-08-30
Payer: COMMERCIAL

## 2024-08-30 DIAGNOSIS — R11.0 NAUSEA: ICD-10-CM

## 2024-08-30 DIAGNOSIS — A08.4 VIRAL GASTROENTERITIS: Primary | ICD-10-CM

## 2024-08-30 DIAGNOSIS — R10.9 ABDOMINAL CRAMPING: ICD-10-CM

## 2024-08-30 RX ORDER — ONDANSETRON 4 MG/1
TABLET, ORALLY DISINTEGRATING ORAL
Qty: 12 TABLET | Refills: 1 | Status: SHIPPED | OUTPATIENT
Start: 2024-08-30

## 2024-08-30 RX ORDER — DICYCLOMINE HCL 20 MG
20 TABLET ORAL 4 TIMES DAILY PRN
Qty: 30 TABLET | Refills: 0 | Status: SHIPPED | OUTPATIENT
Start: 2024-08-30

## 2024-08-30 NOTE — PROGRESS NOTES
Video Visit    This visit is conducted using Telemedicine with live, interactive video and audio.    Sunday Ocampo Bhupendranathalia  verbally consents to a Video visit.    Patient understands and accepts financial responsibility for any deductible, co-insurance and/or co-pays associated with this service.    Duration of the service: 11:49 minutes      Summary of topics discussed:     Stomach pain, vomiting: Pt has been having intermittent abdominal pain with + N/V x 5 days.  He describes the vomiting as \"violent\" like in the Exorcist, is non-bloody and non-bilious.  He has no F/C.  Has not noticed any pattern with eating.  The pain feels like he needs to have a BM, but doesn't really have to go.  Also having cramping pain, especially after vomiting. Does not have an associated migraine.  Has not had any recent travel and no known sick contacts.  He works at [email or web address], denies any recent outbreaks of any GI illness.  Of note, his Psych increased the dose of Lamicatl 2-3 wks ago. He has been taking Pepto Bismol and PPI.    ROS: as stated per HPI  Physical Exam: Exam is limited due to no face to face visit today. Pt is awake and alert, does not appear to be in acute distress.       Assessment/Plan:  1) Viral Gastroenteritis  - suspect a viral infection and reassured pt this will resolve on its own after up to 5-7 days  - supportive care d/w pt  - cont bland/BRAT diet  - if sx become severe, advised to go to ED    2) Abdominal cramping  - Rx sent for Dicyclomine QID prn  - R/B/SE of new meds d/w pt    3) Nausea  - Rx sent for Zofran prn      Orders Placed This Encounter    ondansetron 4 MG Oral Tablet Dispersible     Sig: PLACE 1 TABLET IN MOUTH EVERY 8 HOURS AS NEEDED FOR NAUSEA     Dispense:  12 tablet     Refill:  1    dicyclomine 20 MG Oral Tab     Sig: Take 1 tablet (20 mg total) by mouth 4 (four) times daily as needed (abdominal cramping).     Dispense:  30 tablet     Refill:  0      No orders of the defined types were  placed in this encounter.         Return if symptoms worsen or fail to improve, for annual physical overdue.      Joanne Fang MD

## 2024-10-10 ENCOUNTER — APPOINTMENT (OUTPATIENT)
Dept: GENERAL RADIOLOGY | Age: 29
End: 2024-10-10
Attending: PHYSICIAN ASSISTANT
Payer: COMMERCIAL

## 2024-10-10 ENCOUNTER — HOSPITAL ENCOUNTER (OUTPATIENT)
Age: 29
Discharge: HOME | End: 2024-10-10
Payer: COMMERCIAL

## 2024-10-10 VITALS
WEIGHT: 185 LBS | DIASTOLIC BLOOD PRESSURE: 80 MMHG | HEIGHT: 62 IN | HEART RATE: 72 BPM | SYSTOLIC BLOOD PRESSURE: 111 MMHG | OXYGEN SATURATION: 97 % | BODY MASS INDEX: 34.04 KG/M2 | TEMPERATURE: 98 F | RESPIRATION RATE: 18 BRPM

## 2024-10-10 DIAGNOSIS — R09.89 CHEST CONGESTION: Primary | ICD-10-CM

## 2024-10-10 DIAGNOSIS — J40 BRONCHITIS: ICD-10-CM

## 2024-10-10 LAB
POCT INFLUENZA A: NEGATIVE
POCT INFLUENZA B: NEGATIVE
SARS-COV-2 RNA RESP QL NAA+PROBE: NOT DETECTED

## 2024-10-10 PROCEDURE — 71046 X-RAY EXAM CHEST 2 VIEWS: CPT | Performed by: PHYSICIAN ASSISTANT

## 2024-10-10 PROCEDURE — 87502 INFLUENZA DNA AMP PROBE: CPT | Performed by: PHYSICIAN ASSISTANT

## 2024-10-10 PROCEDURE — 93000 ELECTROCARDIOGRAM COMPLETE: CPT | Performed by: PHYSICIAN ASSISTANT

## 2024-10-10 PROCEDURE — 94640 AIRWAY INHALATION TREATMENT: CPT | Performed by: PHYSICIAN ASSISTANT

## 2024-10-10 PROCEDURE — U0002 COVID-19 LAB TEST NON-CDC: HCPCS | Performed by: PHYSICIAN ASSISTANT

## 2024-10-10 PROCEDURE — 99214 OFFICE O/P EST MOD 30 MIN: CPT | Performed by: PHYSICIAN ASSISTANT

## 2024-10-10 RX ORDER — PREDNISONE 20 MG/1
40 TABLET ORAL DAILY
Qty: 10 TABLET | Refills: 0 | Status: SHIPPED | OUTPATIENT
Start: 2024-10-10 | End: 2024-10-15

## 2024-10-10 RX ORDER — OXYMETAZOLINE HYDROCHLORIDE 0.05 G/100ML
1 SPRAY NASAL 2 TIMES DAILY
Qty: 15 ML | Refills: 0 | Status: SHIPPED | OUTPATIENT
Start: 2024-10-10 | End: 2024-11-09

## 2024-10-10 RX ORDER — PSEUDOEPHEDRINE HCL 30 MG
30 TABLET ORAL 3 TIMES DAILY
Qty: 21 TABLET | Refills: 0 | Status: SHIPPED | OUTPATIENT
Start: 2024-10-10

## 2024-10-10 RX ORDER — BENZONATATE 100 MG/1
100 CAPSULE ORAL 3 TIMES DAILY PRN
Qty: 30 CAPSULE | Refills: 0 | Status: SHIPPED | OUTPATIENT
Start: 2024-10-10 | End: 2024-11-09

## 2024-10-10 RX ORDER — IPRATROPIUM BROMIDE AND ALBUTEROL SULFATE 2.5; .5 MG/3ML; MG/3ML
3 SOLUTION RESPIRATORY (INHALATION) ONCE
Status: COMPLETED | OUTPATIENT
Start: 2024-10-10 | End: 2024-10-10

## 2024-10-10 RX ORDER — ALBUTEROL SULFATE 90 UG/1
2 INHALANT RESPIRATORY (INHALATION) EVERY 4 HOURS PRN
Qty: 1 EACH | Refills: 0 | Status: SHIPPED | OUTPATIENT
Start: 2024-10-10 | End: 2024-11-09

## 2024-10-10 NOTE — ED PROVIDER NOTES
Patient Seen in: [facility]      History     Chief Complaint   Patient presents with    Cough     Stated Complaint: congestion chest hurts    Subjective:   HPI    Sunday Jo is a 28 year old adult presents with acute onset of URI symptoms x 2 days. Patient reports chest congestion, sinus congestion, non productive cough, rhinorrhea.  Patient denies dysphagia, throat pain, ear pain,  fevers, chills, shortness of breath, respiratory distress, stridor, neck pain/ stiffness, headache, eye pain/ redness, facial/ lip/ eyelid swelling. No medications taken prior to arrival. No alleviating/ aggravating factors. Patient is  concerned about COVID 19 infection at this encounter.  Patient is  immunized for COVID 19.        Objective:     Past Medical History:    Allergic rhinitis    Anxiety    Bipolar disorder (HCC)    Celiac disease (HCC)    Depression    Obesity    Sphincter of Oddi dysfunction              Past Surgical History:   Procedure Laterality Date    Adenoidectomy      Same time as Tonsils    Bile duct endoscopy,intraoperative      had a stent put in at age 20    Cholecystectomy  2009    Colonoscopy  2012    Hc implant ear tubes      age 4    Hysterectomy  2018    complete hysterectomy    Laparoscopic cholecystectomy      age 14    Mastectomy      d/t transgender, age 21    Other surgical history  2016    Double mastectomy w/ nipple grafts    Removal adenoids,primary,12+ y/o      age 10    Tonsillectomy      age 10                Social History     Socioeconomic History    Marital status: Single   Tobacco Use    Smoking status: Every Day     Current packs/day: 0.50     Average packs/day: 0.5 packs/day for 14.0 years (7.0 ttl pk-yrs)     Types: Cigarettes     Passive exposure: Never    Smokeless tobacco: Never    Tobacco comments:     I am trying to quit, was able to cut down to half a pack   Vaping Use    Vaping status: Never Used   Substance and Sexual Activity    Alcohol use:  Yes     Alcohol/week: 2.0 - 4.0 standard drinks of alcohol     Types: 1 - 3 Glasses of wine, 1 Shots of liquor per week     Comment: It is very sporadic, I go months without touching it    Drug use: Not Currently     Types: Cocaine, Cannabis, benzodiazepines     Comment: None    Sexual activity: Yes     Partners: Female   Other Topics Concern    Caffeine Concern No    Exercise No    Seat Belt No    Special Diet Yes     Comment: I am having weird reactions to food that I didn't before    Stress Concern Yes     Comment: A little bit when school is in session    Weight Concern Yes     Comment: I gained a significant amount of weight from previous meds              Review of Systems   All other systems reviewed and are negative.      Positive for stated complaint: congestion chest hurts  Other systems are as noted in HPI.  Constitutional and vital signs reviewed.      All other systems reviewed and negative except as noted above.    Physical Exam     ED Triage Vitals [10/10/24 1226]   /90   Pulse 95   Resp 18   Temp 97.7 °F (36.5 °C)   Temp src Temporal   SpO2 99 %   O2 Device None (Room air)       Current Vitals:   Vital Signs  BP: 122/90  Pulse: 95  Resp: 18  Temp: 97.7 °F (36.5 °C)  Temp src: Temporal    Oxygen Therapy  SpO2: 99 %  O2 Device: None (Room air)        Physical Exam  Vitals and nursing note reviewed.   Constitutional:       General: Restorationism is not in acute distress.     Appearance: Normal appearance. Restorationism is normal weight. Restorationism is not ill-appearing, toxic-appearing or diaphoretic.   HENT:      Head: Normocephalic and atraumatic.      Right Ear: Tympanic membrane, ear canal and external ear normal.      Left Ear: Tympanic membrane, ear canal and external ear normal.      Nose: Congestion present. No rhinorrhea.      Mouth/Throat:      Mouth: Mucous membranes are moist.      Pharynx: Oropharynx is clear. No oropharyngeal exudate or posterior oropharyngeal erythema.   Eyes:       Extraocular Movements: Extraocular movements intact.      Conjunctiva/sclera: Conjunctivae normal.      Pupils: Pupils are equal, round, and reactive to light.   Pulmonary:      Effort: Pulmonary effort is normal. No respiratory distress.      Breath sounds: No stridor. No wheezing, rhonchi or rales.   Chest:      Chest wall: No tenderness.   Musculoskeletal:         General: No swelling, tenderness, deformity or signs of injury. Normal range of motion.      Cervical back: Normal range of motion and neck supple.   Skin:     General: Skin is warm and dry.      Capillary Refill: Capillary refill takes less than 2 seconds.      Coloration: Skin is not jaundiced or pale.      Findings: No bruising, erythema, lesion or rash.   Neurological:      General: No focal deficit present.      Mental Status: Jain is alert and oriented to person, place, and time. Mental status is at baseline.   Psychiatric:         Mood and Affect: Mood normal.         Behavior: Behavior normal.             ED Course     Labs Reviewed   RAPID SARS-COV-2 BY PCR - Normal   POCT FLU TEST - Normal    Narrative:     This assay is a rapid molecular in vitro test utilizing nucleic acid amplification of influenza A and B viral RNA.     Results for orders placed or performed during the hospital encounter of 10/10/24   EKG    Collection Time: 10/10/24 12:37 PM   Result Value Ref Range    Ventricular rate 78 BPM    Atrial rate 78 BPM    P-R Interval 152 ms    QRS Duration 78 ms    Q-T Interval 352 ms    QTC Calculation (Bezet) 401 ms    P Axis 44 degrees    R Axis 26 degrees    T Axis 29 degrees   Rapid SARS-CoV-2 by PCR    Collection Time: 10/10/24 12:40 PM    Specimen: Nares; Other   Result Value Ref Range    Rapid SARS-CoV-2 by PCR Not Detected Not Detected   POCT Flu Test    Collection Time: 10/10/24 12:40 PM    Specimen: Nares; Other   Result Value Ref Range    POCT INFLUENZA A Negative Negative    POCT INFLUENZA B Negative Negative     Vitals:     10/10/24 1226   BP: 122/90   Pulse: 95   Resp: 18   Temp: 97.7 °F (36.5 °C)       XR CHEST PA + LAT CHEST (CPT=71046)   Final Result   PROCEDURE:  XR CHEST PA + LAT CHEST (CPT=71046)       INDICATIONS:  congestion chest hurts.  rule out pneumonia       COMPARISON:  None.       TECHNIQUE:  PA and lateral chest radiographs were obtained.       PATIENT STATED HISTORY: (As transcribed by Technologist)  Congestion with    pain.            FINDINGS:     LUNGS:  No focal consolidation.  Normal vascularity.   CARDIAC:  Normal size cardiac silhouette.   MEDIASTINUM:  Normal.   PLEURA:  Normal.  No pleural effusions.   BONES:  Levoscoliosis thoracic spine.                         =====   CONCLUSION:  No acute cardiopulmonary process.       LOCATION:  [facility]           Dictated by (CST): Gisele Rodriguez MD on 10/10/2024 at 12:47 PM        Finalized by (CST): Gisele Rodriguez MD on 10/10/2024 at 12:49 PM             EKG    Rate, intervals and axes as noted on EKG Report.  Rate: 78 BPM  Rhythm: Sinus Rhythm  Reading: no ST segment elevation/ depression.  No QT prolongation           ED Course as of 10/10/24 1342  ------------------------------------------------------------  Time: 10/10 1301  Value: Rapid SARS-CoV-2 by PCR  Comment: Negative    ------------------------------------------------------------  Time: 10/10 1302  Value: EKG  Comment: (Reviewed)  ------------------------------------------------------------  Time: 10/10 1302  Value: XR CHEST PA + LAT CHEST (CPT=71046)  Comment: No consolidation or cardiopulmonary abrnomality   ------------------------------------------------------------  Time: 10/10 1311  Value: POCT Flu Test  Comment: Negative                MDM             Medical Decision Making  28 year old adult presents with acute chest congestion, rhinorrhea, and URI symptoms. Considerations to include but not limited to bronchitis vs pneumonia vs COVID 19 vs influenza A vs influenza B.  Patient is overall  well-appearing, normotensive, nontachycardic, not dyspneic with oxygen saturation at 99% on room air  Plan  - COVID 19/ flu A and B  swab  - SpO2 99% on room air  - duoneb x 1 doses (albuterol 5mg/ atrovent 0.5mg).   - CXR pa/ lateral   - reassess  - DC to home   - rx: albuterol inhaler 1-2 puffs by mouth every 4-6 hours/ prn.   Prednisone 40mg  po daily x 5 days.  Afrin 2 sprays to bilateral nostrils BID for no more than 48 consecutive hours to avoid rebound congestion.  Sudafed 30mg po q 6 hours.   Tessalon 100mg PO TID.    - refer to PCP for further evaluation    - return to ED        Amount and/or Complexity of Data Reviewed  Labs: ordered. Decision-making details documented in ED Course.  Radiology: ordered and independent interpretation performed. Decision-making details documented in ED Course.     Details: no consolidation or cardiopulmonary abnormality based my independent interpretation        Disposition and Plan     Clinical Impression:  1. Chest congestion    2. Bronchitis         Disposition:  Discharge  10/10/2024  1:41 pm    Follow-up:  Joanne Fang MD  [address]  [place]  [facility] [address]  [phone]          [address] [id number] [phone]              Medications Prescribed:  Current Discharge Medication List        START taking these medications    Details   benzonatate 100 MG Oral Cap Take 1 capsule (100 mg total) by mouth 3 (three) times daily as needed for cough.  Qty: 30 capsule, Refills: 0      oxymetazoline 0.05 % Nasal Solution 1 spray by Nasal route 2 (two) times daily.  Qty: 15 mL, Refills: 0      pseudoephedrine 30 MG Oral Tab Take 1 tablet (30 mg total) by mouth in the morning, at noon, and at bedtime.  Qty: 21 tablet, Refills: 0    Associated Diagnoses: Chest congestion; Bronchitis      albuterol 108 (90 Base) MCG/ACT Inhalation Aero Soln Inhale 2 puffs into the lungs every 4 (four) hours as needed for  Wheezing.  Qty: 1 each, Refills: 0      !! predniSONE 20 MG Oral Tab Take 2 tablets (40 mg total) by mouth daily for 5 days.  Qty: 10 tablet, Refills: 0       !! - Potential duplicate medications found. Please discuss with provider.              Supplementary Documentation:

## 2024-10-12 LAB
ATRIAL RATE: 78 BPM
P AXIS: 44 DEGREES
P-R INTERVAL: 152 MS
Q-T INTERVAL: 352 MS
QRS DURATION: 78 MS
QTC CALCULATION (BEZET): 401 MS
R AXIS: 26 DEGREES
T AXIS: 29 DEGREES
VENTRICULAR RATE: 78 BPM

## 2024-11-12 ENCOUNTER — HOSPITAL ENCOUNTER (OUTPATIENT)
Age: 29
Discharge: HOME | End: 2024-11-12
Payer: COMMERCIAL

## 2024-11-12 ENCOUNTER — APPOINTMENT (OUTPATIENT)
Dept: GENERAL RADIOLOGY | Age: 29
End: 2024-11-12
Attending: NURSE PRACTITIONER
Payer: COMMERCIAL

## 2024-11-12 VITALS
WEIGHT: 190 LBS | HEIGHT: 62 IN | DIASTOLIC BLOOD PRESSURE: 85 MMHG | OXYGEN SATURATION: 98 % | BODY MASS INDEX: 34.96 KG/M2 | RESPIRATION RATE: 18 BRPM | SYSTOLIC BLOOD PRESSURE: 120 MMHG | HEART RATE: 89 BPM | TEMPERATURE: 98 F

## 2024-11-12 DIAGNOSIS — M79.673 FOOT PAIN: Primary | ICD-10-CM

## 2024-11-12 PROCEDURE — 73630 X-RAY EXAM OF FOOT: CPT | Performed by: NURSE PRACTITIONER

## 2024-11-12 NOTE — ED INITIAL ASSESSMENT (HPI)
Pt has been standing a lot and yesterdayleft foot pain began and pain keeps getting worse.  No specific injury

## 2024-11-12 NOTE — DISCHARGE INSTRUCTIONS
Rest, ice to area, elevate.      Ibuprofen as directed with food every 6 hours.     Wear shoes with good support, avoid flat slides or flip flops.     Follow up with Podiatry.

## 2024-11-12 NOTE — ED PROVIDER NOTES
Patient Seen in: [facility]      History     Chief Complaint   Patient presents with    Foot Pain     Stated Complaint: left foot pain    Subjective:   27 yo patient presents with complaint of atraumatic, left foot pain that began yesterday. 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   Wears slip on shoes and they are about 2 years old.   No prior injury to this foot.   No OTC's taken for pain.     Denies fever, chills, numbness, tinging, redness or swelling.     The history is provided by the patient.             Objective:     Past Medical History:    Allergic rhinitis    Anxiety    Bipolar disorder (HCC)    Celiac disease (HCC)    Depression    Obesity    Sphincter of Oddi dysfunction              Past Surgical History:   Procedure Laterality Date    Adenoidectomy      Same time as Tonsils    Bile duct endoscopy,intraoperative      had a stent put in at age 20    Cholecystectomy  2009    Colonoscopy  2012    Hc implant ear tubes      age 4    Hysterectomy  2018    complete hysterectomy    Laparoscopic cholecystectomy      age 14    Mastectomy      d/t transgender, age 21    Other surgical history  2016    Double mastectomy w/ nipple grafts    Removal adenoids,primary,12+ y/o      age 10    Tonsillectomy      age 10                Social History     Socioeconomic History    Marital status: Single   Tobacco Use    Smoking status: Every Day     Current packs/day: 0.50     Average packs/day: 0.5 packs/day for 14.0 years (7.0 ttl pk-yrs)     Types: Cigarettes     Passive exposure: Never    Smokeless tobacco: Never    Tobacco comments:     I am trying to quit, was able to cut down to half a pack   Vaping Use    Vaping status: Never Used   Substance and Sexual Activity    Alcohol use: Yes     Alcohol/week: 2.0 - 4.0 standard drinks of alcohol     Types: 1 - 3 Glasses of wine, 1 Shots of liquor per week     Comment: It is very sporadic, I go months without touching it    Drug use: Not Currently      Types: Cocaine, Cannabis, benzodiazepines     Comment: None    Sexual activity: Yes     Partners: Female   Other Topics Concern    Caffeine Concern No    Exercise No    Seat Belt No    Special Diet Yes     Comment: I am having weird reactions to food that I didn't before    Stress Concern Yes     Comment: A little bit when school is in session    Weight Concern Yes     Comment: I gained a significant amount of weight from previous meds              Review of Systems   Constitutional:  Negative for chills and fever.   Neurological:  Negative for numbness.       Positive for stated complaint: left foot pain  Other systems are as noted in HPI.  Constitutional and vital signs reviewed.      All other systems reviewed and negative except as noted above.    Physical Exam     ED Triage Vitals [11/12/24 0833]   /85   Pulse 89   Resp 18   Temp 98 °F (36.7 °C)   Temp src Temporal   SpO2 98 %   O2 Device None (Room air)       Current Vitals:   Vital Signs  BP: 120/85  Pulse: 89  Resp: 18  Temp: 98 °F (36.7 °C)  Temp src: Temporal    Oxygen Therapy  SpO2: 98 %  O2 Device: None (Room air)        Physical Exam  Vitals and nursing note reviewed.   Constitutional:       General: Sunday is not in acute distress.     Appearance: Normal appearance. Sunday is not ill-appearing, toxic-appearing or diaphoretic.   HENT:      Head: Normocephalic and atraumatic.   Musculoskeletal:      Left foot: Normal range of motion and normal capillary refill. Tenderness (dorsal aspect of lateral foot; no bony tenderness. no erythema, wound, warmth or swelling.) present. No swelling, deformity or crepitus. Normal pulse.   Skin:     General: Skin is warm and dry.      Findings: No erythema or rash.   Neurological:      Mental Status: Sunday is alert.   Psychiatric:         Mood and Affect: Mood normal.             ED Course   Labs Reviewed - No data to display  XR FOOT, COMPLETE (MIN 3 VIEWS), LEFT (CPT=73630)          PROCEDURE:  XR  FOOT, COMPLETE (MIN 3 VIEWS), LEFT (CPT=73630)     TECHNIQUE:  AP, oblique, and lateral views were obtained.     COMPARISON:  None.     INDICATIONS:  left foot pain     PATIENT STATED HISTORY: (As transcribed by Technologist)  Pain on bottom of foot from standing too long.         FINDINGS:    BONES:  Normal.  No significant arthropathy or acute abnormality.  SOFT TISSUES:  Negative.  No visible soft tissue swelling.  EFFUSION:  None visible.  OTHER:  Negative.                   =====  CONCLUSION:  Unremarkable left foot radiographs.        LOCATION:  [facility]        Dictated by (CST): Modesto Shah MD on 11/12/2024 at 9:36 AM       Finalized by (CST): Modesto Shah MD on 11/12/2024 at 9:37 AM                 MDM      Medical Decision Making  27 yo patient presents with complaint of atraumatic, left foot pain that began yesterday.  Diff dx include stress fracture vs tendonitis vs gout.  On exam, pt is well appearing, normal vitals.   Xray negative for acute findings.   Recommend rest, ice, Ibuprofen as directed with food.   Wear supportive shoes with arch support.   Note for work provided.   Referral to Podiatry; pt to call and schedule appt.   Return precautions discussed.   Pt agreeable to plan.     Amount and/or Complexity of Data Reviewed  External Data Reviewed: notes.  Radiology: ordered.    Risk  OTC drugs.        Disposition and Plan     Clinical Impression:  1. Foot pain         Disposition:  Discharge  11/12/2024  9:39 am    Follow-up:  Sarah Rosen DPM  [address] [place] [address] [address]  [facility] [address]  [phone]          Kimberly Berger DPM  [address] [facility] [address]  [facility] [address]  [phone]                Medications Prescribed:  Current Discharge Medication List              Supplementary Documentation:

## 2024-11-30 ENCOUNTER — APPOINTMENT (OUTPATIENT)
Dept: CT IMAGING | Facility: HOSPITAL | Age: 29
End: 2024-11-30
Attending: EMERGENCY MEDICINE
Payer: COMMERCIAL

## 2024-11-30 ENCOUNTER — HOSPITAL ENCOUNTER (EMERGENCY)
Facility: HOSPITAL | Age: 29
Discharge: HOME | End: 2024-11-30
Attending: EMERGENCY MEDICINE
Payer: COMMERCIAL

## 2024-11-30 VITALS
TEMPERATURE: 98 F | WEIGHT: 195 LBS | OXYGEN SATURATION: 95 % | HEIGHT: 62 IN | RESPIRATION RATE: 15 BRPM | BODY MASS INDEX: 35.88 KG/M2 | SYSTOLIC BLOOD PRESSURE: 108 MMHG | HEART RATE: 83 BPM | DIASTOLIC BLOOD PRESSURE: 72 MMHG

## 2024-11-30 DIAGNOSIS — K52.9 GASTROENTERITIS: Primary | ICD-10-CM

## 2024-11-30 LAB
ALBUMIN SERPL-MCNC: 4.4 G/DL (ref 3.2–4.8)
ALBUMIN/GLOB SERPL: 1.4 {RATIO} (ref 1–2)
ALP LIVER SERPL-CCNC: 75 U/L
ALT SERPL-CCNC: 44 U/L
ANION GAP SERPL CALC-SCNC: 11 MMOL/L (ref 0–18)
AST SERPL-CCNC: 35 U/L (ref ?–34)
BASOPHILS # BLD AUTO: 0.08 X10(3) UL (ref 0–0.2)
BASOPHILS NFR BLD AUTO: 0.9 %
BILIRUB SERPL-MCNC: 0.4 MG/DL (ref 0.3–1.2)
BUN BLD-MCNC: 10 MG/DL (ref 9–23)
CALCIUM BLD-MCNC: 10.2 MG/DL (ref 8.7–10.4)
CHLORIDE SERPL-SCNC: 108 MMOL/L (ref 98–112)
CO2 SERPL-SCNC: 22 MMOL/L (ref 21–32)
CREAT BLD-MCNC: 0.85 MG/DL
EGFRCR SERPLBLD CKD-EPI 2021: 120 ML/MIN/1.73M2 (ref 60–?)
EOSINOPHIL # BLD AUTO: 0.11 X10(3) UL (ref 0–0.7)
EOSINOPHIL NFR BLD AUTO: 1.2 %
ERYTHROCYTE [DISTWIDTH] IN BLOOD BY AUTOMATED COUNT: 11.7 %
GLOBULIN PLAS-MCNC: 3.2 G/DL (ref 2–3.5)
GLUCOSE BLD-MCNC: 108 MG/DL (ref 70–99)
GLUCOSE BLD-MCNC: 110 MG/DL (ref 70–99)
HCT VFR BLD AUTO: 46.2 %
HGB BLD-MCNC: 16 G/DL
IMM GRANULOCYTES # BLD AUTO: 0.03 X10(3) UL (ref 0–1)
IMM GRANULOCYTES NFR BLD: 0.3 %
LIPASE SERPL-CCNC: 27 U/L (ref 12–53)
LYMPHOCYTES # BLD AUTO: 4.22 X10(3) UL (ref 1–4)
LYMPHOCYTES NFR BLD AUTO: 46.3 %
MCH RBC QN AUTO: 30.3 PG (ref 26–34)
MCHC RBC AUTO-ENTMCNC: 34.6 G/DL (ref 31–37)
MCV RBC AUTO: 87.5 FL
MONOCYTES # BLD AUTO: 0.88 X10(3) UL (ref 0.1–1)
MONOCYTES NFR BLD AUTO: 9.6 %
NEUTROPHILS # BLD AUTO: 3.8 X10 (3) UL (ref 1.5–7.7)
NEUTROPHILS # BLD AUTO: 3.8 X10(3) UL (ref 1.5–7.7)
NEUTROPHILS NFR BLD AUTO: 41.7 %
OSMOLALITY SERPL CALC.SUM OF ELEC: 292 MOSM/KG (ref 275–295)
PLATELET # BLD AUTO: 243 10(3)UL (ref 150–450)
POTASSIUM SERPL-SCNC: 3.7 MMOL/L (ref 3.5–5.1)
PROT SERPL-MCNC: 7.6 G/DL (ref 5.7–8.2)
RBC # BLD AUTO: 5.28 X10(6)UL
SODIUM SERPL-SCNC: 141 MMOL/L (ref 136–145)
WBC # BLD AUTO: 9.1 X10(3) UL (ref 4–11)

## 2024-11-30 PROCEDURE — 99285 EMERGENCY DEPT VISIT HI MDM: CPT

## 2024-11-30 PROCEDURE — 85025 COMPLETE CBC W/AUTO DIFF WBC: CPT

## 2024-11-30 PROCEDURE — 83690 ASSAY OF LIPASE: CPT

## 2024-11-30 PROCEDURE — 96374 THER/PROPH/DIAG INJ IV PUSH: CPT

## 2024-11-30 PROCEDURE — 96361 HYDRATE IV INFUSION ADD-ON: CPT

## 2024-11-30 PROCEDURE — 74177 CT ABD & PELVIS W/CONTRAST: CPT | Performed by: EMERGENCY MEDICINE

## 2024-11-30 PROCEDURE — 80053 COMPREHEN METABOLIC PANEL: CPT

## 2024-11-30 PROCEDURE — 82962 GLUCOSE BLOOD TEST: CPT

## 2024-11-30 PROCEDURE — 96375 TX/PRO/DX INJ NEW DRUG ADDON: CPT

## 2024-11-30 RX ORDER — SODIUM CHLORIDE 9 MG/ML
INJECTION, SOLUTION INTRAVENOUS CONTINUOUS
Status: DISCONTINUED | OUTPATIENT
Start: 2024-11-30 | End: 2024-11-30

## 2024-11-30 RX ORDER — ONDANSETRON 2 MG/ML
4 INJECTION INTRAMUSCULAR; INTRAVENOUS ONCE
Status: COMPLETED | OUTPATIENT
Start: 2024-11-30 | End: 2024-11-30

## 2024-11-30 RX ORDER — DICYCLOMINE HCL 20 MG
20 TABLET ORAL 4 TIMES DAILY PRN
Qty: 30 TABLET | Refills: 0 | Status: SHIPPED | OUTPATIENT
Start: 2024-11-30 | End: 2024-12-30

## 2024-11-30 RX ORDER — ONDANSETRON 4 MG/1
4 TABLET, ORALLY DISINTEGRATING ORAL EVERY 4 HOURS PRN
Qty: 10 TABLET | Refills: 0 | Status: SHIPPED | OUTPATIENT
Start: 2024-11-30 | End: 2024-12-07

## 2024-11-30 RX ORDER — KETOROLAC TROMETHAMINE 15 MG/ML
15 INJECTION, SOLUTION INTRAMUSCULAR; INTRAVENOUS ONCE
Status: COMPLETED | OUTPATIENT
Start: 2024-11-30 | End: 2024-11-30

## 2024-11-30 NOTE — ED INITIAL ASSESSMENT (HPI)
C/o waves of severe abd pain starting at the end of the day Thurs; gradually getting worse; n/v/d w/bright red blood since last night; pt has NO gallbladder; unsure about fevers; pain goes from 2/10 to 9/10  in waves; right mid to lower abd into flank    Pt hx of hermaphroditism and hystorectomy; pt taking testosterone

## 2024-11-30 NOTE — ED PROVIDER NOTES
Patient Seen in: [facility] Emergency Department      History     Chief Complaint   Patient presents with    Abdomen/Flank Pain     Stated Complaint: abd pain    Subjective:   HPI      28-year-old comes to the hospital complaint of having difficulty with abdominal pain associate with nausea, vomiting and symptoms bowel movements since Thursday.  The pain has been constant but can increase in intensity up to an 8 out of 10.  There is no headaches or dizziness.  There is no chest pain.  Is been no known fevers.  Nothing specific makes his pain better or worse.  There is no dysuria.  There are no other complaints this time.    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    Obesity    Sphincter of Oddi dysfunction              Past Surgical History:   Procedure Laterality Date    Adenoidectomy      Same time as Tonsils    Bile duct endoscopy,intraoperative      had a stent put in at age 20    Cholecystectomy  2009    Colonoscopy  2012    Hc implant ear tubes      age 4    Hysterectomy  2018    complete hysterectomy    Laparoscopic cholecystectomy      age 14    Mastectomy      d/t transgender, age 21    Other surgical history  2016    Double mastectomy w/ nipple grafts    Removal adenoids,primary,12+ y/o      age 10    Tonsillectomy      age 10                Social History     Socioeconomic History    Marital status: Single   Tobacco Use    Smoking status: Every Day     Current packs/day: 0.50     Average packs/day: 0.5 packs/day for 14.0 years (7.0 ttl pk-yrs)     Types: Cigarettes     Passive exposure: Never    Smokeless tobacco: Never    Tobacco comments:     I am trying to quit, was able to cut down to half a pack   Vaping Use    Vaping status: Never Used   Substance and Sexual Activity    Alcohol use: Yes     Alcohol/week: 2.0 - 4.0  standard drinks of alcohol     Types: 1 - 3 Glasses of wine, 1 Shots of liquor per week     Comment: It is very sporadic, I go months without touching it    Drug use: Not Currently     Types: Cocaine, Cannabis, benzodiazepines     Comment: None    Sexual activity: Yes     Partners: Female   Other Topics Concern    Caffeine Concern No    Exercise No    Seat Belt No    Special Diet Yes     Comment: I am having weird reactions to food that I didn't before    Stress Concern Yes     Comment: A little bit when school is in session    Weight Concern Yes     Comment: I gained a significant amount of weight from previous meds                  Physical Exam     ED Triage Vitals   BP 11/30/24 1109 (!) 124/93   Pulse 11/30/24 1109 94   Resp 11/30/24 1109 14   Temp 11/30/24 1122 97.7 °F (36.5 °C)   Temp src 11/30/24 1122 Oral   SpO2 11/30/24 1109 98 %   O2 Device 11/30/24 1109 None (Room air)       Current Vitals:   Vital Signs  BP: (!) 124/93  Pulse: 94  Resp: 14  Temp: 97.7 °F (36.5 °C)  Temp src: Oral  MAP (mmHg): (!) 104    Oxygen Therapy  SpO2: 98 %  O2 Device: None (Room air)        Physical Exam  HEENT : NCAT, EOMI, PEERL,  neck supple, no JVD, trachea midline, No LAD  Heart: S1S2 normal. No murmurs, regular rate and rhythm  Lungs: Clear to auscultation bilaterally  Abdomen: Soft right abdominal region is tender nondistended normal active bowel sounds without rebound, guarding or masses noted  Back nontender without CVA tenderness  Extremity no clubbing, cyanosis or edema noted.  Full range of motion noted without tenderness  Neuro: No focal deficits noted    All measures to prevent infection transmission during my interaction with the patient were taken.  The patient was already wearing droplet mask on my arrival to the room.  Personal protective equipment including a droplet mask as well as gloves were worn throughout the duration of my exam.  Hand washing was performed prior to and after the exam.  Stethoscope and  equipment used during my examination was cleaned with a super Sani cloth germicidal wipe following the exam.    ED Course     Labs Reviewed   COMP METABOLIC PANEL (14) - Abnormal; Notable for the following components:       Result Value    Glucose 110 (*)     AST 35 (*)     All other components within normal limits   CBC WITH DIFFERENTIAL WITH PLATELET - Abnormal; Notable for the following components:    Lymphocyte Absolute 4.22 (*)     All other components within normal limits   POCT GLUCOSE - Abnormal; Notable for the following components:    POC Glucose 108 (*)     All other components within normal limits   LIPASE - Normal   URINALYSIS WITH CULTURE REFLEX   RAINBOW DRAW LAVENDER   RAINBOW DRAW LIGHT GREEN   RAINBOW DRAW BLUE   RAINBOW DRAW GOLD       ED Course as of 11/30/24 1249  ------------------------------------------------------------  Time: 11/30 1247  Comment: Hide of the lipase was negative.  The patient's CMP was unremarkable.  The the CBC has a white count of 9.1 thousand.  The patient had a CT abdomen pelvis that by my interpretation showed no acute surgical pathology.  Read the radiology report as well.  Patient was given Zofran and Toradol and IV fluid with improvement.       CT ABDOMEN+PELVIS(CONTRAST ONLY)(CPT=74177)    Result Date: 11/30/2024  PROCEDURE:  CT ABDOMEN+PELVIS (CONTRAST ONLY) (CPT=74177)  COMPARISON:  None.  INDICATIONS:  abd pain  TECHNIQUE:  CT scanning was performed from the dome of the diaphragm to the pubic symphysis with non-ionic intravenous contrast material. Post contrast coronal MPR imaging was performed.  Dose reduction techniques were used. Dose information is transmitted to the ACR (American College of Radiology) NRDR (National Radiology Data Registry) which includes the Dose Index Registry.  PATIENT STATED HISTORY:(As transcribed by Technologist)  right flank pain that comes in waves per patient.    CONTRAST USED:  80cc of Isovue 370  FINDINGS:    LIVER:  Unremarkable.   BILIARY:  Cholecystectomy.  PANCREAS:  Unremarkable.  SPLEEN:  Unremarkable.  KIDNEYS:  No acute abnormality.  ADRENALS:  Unremarkable.  AORTA/VASCULAR:  No aortic aneurysm.  RETROPERITONEUM:  Unremarkable.  BOWEL/MESENTERY:  No acute process.  Moderate amount of stool throughout the colon.  No sign of colitis, diverticulitis, bowel obstruction, free air, large volume drainable, mesenteric adenopathy, mesenteric edema.  Normal appendix seen.  ABDOMINAL WALL:  Mild subcutaneous stranding anterior abdominal wall likely injection related changes, no sign of mass or abscess.  URINARY BLADDER:  Limited assessment of the urinary bladder which contains only a small amount of urine at the time of scanning, but based on the appearance on this examination, no specific bladder abnormalities identified.   LYMPH NODES PELVIS:  Unremarkable.  PELVIC ORGANS:  No acute process.  LUNG BASES:  No acute process.  BONES:  No acute abnormality.              CONCLUSION:  No acute process.   LOCATION:  [facility]   Dictated by ([facility]): Ayo Sellers MD on 11/30/2024 at 12:22 PM     Finalized by (CST): Ayo Sellers MD on 11/30/2024 at 12:23 PM       XR FOOT, COMPLETE (MIN 3 VIEWS), LEFT (CPT=73630)    Result Date: 11/12/2024  PROCEDURE:  XR FOOT, COMPLETE (MIN 3 VIEWS), LEFT (CPT=73630)  TECHNIQUE:  AP, oblique, and lateral views were obtained.  COMPARISON:  None.  INDICATIONS:  left foot pain  PATIENT STATED HISTORY: (As transcribed by Technologist)  Pain on bottom of foot from standing too long.    FINDINGS:  BONES:  Normal.  No significant arthropathy or acute abnormality. SOFT TISSUES:  Negative.  No visible soft tissue swelling. EFFUSION:  None visible. OTHER:  Negative.            CONCLUSION:  Unremarkable left foot radiographs.   LOCATION:  [facility]   Dictated by (CST): Modesto Shah MD on 11/12/2024 at 9:36 AM     Finalized by (CST): Modesto Shah MD on 11/12/2024 at 9:37 AM        Medications   sodium chloride 0.9% infusion (has no  administration in time range)   ondansetron (Zofran) 4 MG/2ML injection 4 mg (4 mg Intravenous Given 11/30/24 1116)   sodium chloride 0.9 % IV bolus 1,000 mL (1,000 mL Intravenous New Bag 11/30/24 1115)   ketorolac (Toradol) 15 MG/ML injection 15 mg (15 mg Intravenous Given 11/30/24 1116)   iopamidol 76% (ISOVUE-370) injection for power injector (80 mL Intravenous Given 11/30/24 1215)            MDM      Differential diagnosis included appendicitis, diverticulitis, gastroenteritis but limited such.  Patient's workup is consistent with gastroenteritis with improvement with the above.  This time the patient we discharged home for further outpatient management care.    Patient was screened and evaluated during this visit.   As a treating physician attending to the patient, I determined, within reasonable clinical confidence and prior to discharge, that an emergency medical condition was not or was no longer present.  There was no indication for further evaluation, treatment or admission on an emergency basis.       The usual and customary discharge instuctions were discussed given the patient's ER course.  We discussed signs and symptoms that should prompt the patient's immediate return to the emergency department.   Reasonable over the counter and prescription treatment options and Physician follow up plan was discussed.       The patient is discharged in good condition.     This note was prepared using Dragon Medical voice recognition dictation software.  As a result errors may occur.  When identified to these areas have been corrected.  While every attempt is made to correct errors during dictation discrepancies may still exist.  Please contact if there are any errors.        Medical Decision Making      Disposition and Plan     Clinical Impression:  1. Gastroenteritis         Disposition:  Discharge  11/30/2024 12:48 pm    Follow-up:  Joanne Fang MD  [address]  [address]  [phone]    Schedule an appointment as soon as possible for a visit in 2 day(s)            Medications Prescribed:  Current Discharge Medication List        START taking these medications    Details   !! ondansetron 4 MG Oral Tablet Dispersible Take 1 tablet (4 mg total) by mouth every 4 (four) hours as needed for Nausea.  Qty: 10 tablet, Refills: 0      !! dicyclomine 20 MG Oral Tab Take 1 tablet (20 mg total) by mouth 4 (four) times daily as needed.  Qty: 30 tablet, Refills: 0       !! - Potential duplicate medications found. Please discuss with provider.              Supplementary Documentation:

## 2024-12-04 ENCOUNTER — HOSPITAL ENCOUNTER (OUTPATIENT)
Age: 29
Discharge: HOME | End: 2024-12-04
Payer: COMMERCIAL

## 2024-12-04 VITALS
TEMPERATURE: 99 F | WEIGHT: 200 LBS | HEIGHT: 63 IN | BODY MASS INDEX: 35.44 KG/M2 | RESPIRATION RATE: 18 BRPM | DIASTOLIC BLOOD PRESSURE: 72 MMHG | OXYGEN SATURATION: 99 % | SYSTOLIC BLOOD PRESSURE: 101 MMHG | HEART RATE: 70 BPM

## 2024-12-04 DIAGNOSIS — Z20.822 ENCOUNTER FOR LABORATORY TESTING FOR COVID-19 VIRUS: ICD-10-CM

## 2024-12-04 DIAGNOSIS — J06.9 VIRAL URI WITH COUGH: Primary | ICD-10-CM

## 2024-12-04 LAB — SARS-COV-2 RNA RESP QL NAA+PROBE: NOT DETECTED

## 2024-12-04 PROCEDURE — U0002 COVID-19 LAB TEST NON-CDC: HCPCS | Performed by: NURSE PRACTITIONER

## 2024-12-04 PROCEDURE — 99213 OFFICE O/P EST LOW 20 MIN: CPT | Performed by: NURSE PRACTITIONER

## 2024-12-05 NOTE — ED PROVIDER NOTES
Patient Seen in: [facility]      History     Chief Complaint   Patient presents with    Sore Throat    Runny Nose     Stated Complaint: sore throat; fever; runny nose    Subjective:   28-year-old patient presents with complaint of runny nose, fever (Tmax 99 9), sore throat, and slight cough that began 2 days ago.  Patient's sister whom he lives with is positive for COVID.  OTCs include DayQuil and ibuprofen.    Patient denies shortness of breath, chest pain, nausea, vomiting, diarrhea.    The history is provided by the patient.           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    Obesity    Sphincter of Oddi dysfunction              Past Surgical History:   Procedure Laterality Date    Adenoidectomy      Same time as Tonsils    Bile duct endoscopy,intraoperative      had a stent put in at age 20    Cholecystectomy  2009    Colonoscopy  2012    Hc implant ear tubes      age 4    Hysterectomy  2018    complete hysterectomy    Laparoscopic cholecystectomy      age 14    Mastectomy      d/t transgender, age 21    Other surgical history  2016    Double mastectomy w/ nipple grafts    Removal adenoids,primary,12+ y/o      age 10    Tonsillectomy      age 10                Social History     Socioeconomic History    Marital status: Single   Tobacco Use    Smoking status: Every Day     Current packs/day: 0.50     Average packs/day: 0.5 packs/day for 14.0 years (7.0 ttl pk-yrs)     Types: Cigarettes     Passive exposure: Never    Smokeless tobacco: Never    Tobacco comments:     I am trying to quit, was able to cut down to half a pack   Vaping Use    Vaping status: Never Used   Substance and Sexual Activity    Alcohol use: Yes     Alcohol/week: 2.0 - 4.0 standard drinks of alcohol     Types: 1 - 3 Glasses of wine, 1 Shots of liquor  per week     Comment: It is very sporadic, I go months without touching it    Drug use: Not Currently     Types: Cocaine, Cannabis, benzodiazepines     Comment: None    Sexual activity: Yes     Partners: Female   Other Topics Concern    Caffeine Concern No    Exercise No    Seat Belt No    Special Diet Yes     Comment: I am having weird reactions to food that I didn't before    Stress Concern Yes     Comment: A little bit when school is in session    Weight Concern Yes     Comment: I gained a significant amount of weight from previous meds              Review of Systems   Constitutional:  Positive for fever.   HENT:  Positive for congestion. Negative for sore throat and trouble swallowing.    Respiratory:  Positive for cough.    Gastrointestinal:  Negative for diarrhea, nausea and vomiting.       Positive for stated complaint: sore throat; fever; runny nose  Other systems are as noted in HPI.  Constitutional and vital signs reviewed.      All other systems reviewed and negative except as noted above.    Physical Exam     ED Triage Vitals [12/04/24 6607]   /72   Pulse 70   Resp 18   Temp 98.7 °F (37.1 °C)   Temp src Oral   SpO2 99 %   O2 Device None (Room air)       Current Vitals:   Vital Signs  BP: 101/72  Pulse: 70  Resp: 18  Temp: 98.7 °F (37.1 °C)  Temp src: Oral    Oxygen Therapy  SpO2: 99 %  O2 Device: None (Room air)        Physical Exam  Vitals and nursing note reviewed.   Constitutional:       General: Sunday is not in acute distress.     Appearance: Normal appearance. Sunday is not ill-appearing, toxic-appearing or diaphoretic.   HENT:      Head: Normocephalic.      Right Ear: Tympanic membrane normal.      Left Ear: Tympanic membrane normal.      Nose: Congestion and rhinorrhea present.      Mouth/Throat:      Mouth: Mucous membranes are moist.      Pharynx: No posterior oropharyngeal erythema.   Eyes:      General: No scleral icterus.     Conjunctiva/sclera: Conjunctivae normal.    Cardiovascular:      Rate and Rhythm: Normal rate and regular rhythm.   Pulmonary:      Effort: Pulmonary effort is normal. No respiratory distress.      Breath sounds: Normal breath sounds. No stridor. No wheezing, rhonchi or rales.   Chest:      Chest wall: No tenderness.   Musculoskeletal:      Cervical back: Normal range of motion and neck supple.   Lymphadenopathy:      Cervical: No cervical adenopathy.   Skin:     General: Skin is warm and dry.      Findings: No rash.   Neurological:      Mental Status: Gnosticist is alert.   Psychiatric:         Mood and Affect: Mood normal.             ED Course     Labs Reviewed   RAPID SARS-COV-2 BY PCR - Normal          MDM        Medical Decision Making  28-year-old patient presents with complaint of runny nose, fever (Tmax 99 9), sore throat, and slight cough that began 2 days ago.  Differential diagnoses include COVID versus flu versus viral URI with cough.  On exam patient is well-appearing with normal vitals.  COVID is negative.  Supportive care discussed and return precautions emphasized.  Patient agreeable to plan.      Amount and/or Complexity of Data Reviewed  Labs: ordered.        Disposition and Plan     Clinical Impression:  1. Viral URI with cough    2. Encounter for laboratory testing for COVID-19 virus         Disposition:  Discharge  12/4/2024  6:40 pm    Follow-up:  Joanne Fang MD  [address]  [facility] [address]  [phone]      If symptoms worsen          Medications Prescribed:  Current Discharge Medication List              Supplementary Documentation:

## 2024-12-05 NOTE — DISCHARGE INSTRUCTIONS
We recommend the following for your condition:  Claritin D once a day  Flonase 2 sprays to each side of nose once a day - con't for 2-4 weeks  Nasal saline - either spray (\"Ocean\" brand) or [employer] Med Sinus Rinse 3 times a day  Rest and push fluids  Hot lemon tea with honey  Steam twice a day (either in shower or with cup of hot water and a towel over your head)   Follow up if symptoms worsen.

## 2025-01-29 ENCOUNTER — OFFICE VISIT (OUTPATIENT)
Dept: FAMILY MEDICINE CLINIC | Facility: CLINIC | Age: 30
End: 2025-01-29
Payer: COMMERCIAL

## 2025-01-29 VITALS
OXYGEN SATURATION: 98 % | TEMPERATURE: 97 F | HEART RATE: 80 BPM | WEIGHT: 203.81 LBS | DIASTOLIC BLOOD PRESSURE: 76 MMHG | HEIGHT: 63 IN | BODY MASS INDEX: 36.11 KG/M2 | SYSTOLIC BLOOD PRESSURE: 112 MMHG | RESPIRATION RATE: 20 BRPM

## 2025-01-29 DIAGNOSIS — G89.29 CHRONIC PAIN OF LEFT KNEE: Primary | ICD-10-CM

## 2025-01-29 DIAGNOSIS — Z71.6 ENCOUNTER FOR TOBACCO USE CESSATION COUNSELING: ICD-10-CM

## 2025-01-29 DIAGNOSIS — Z59.87 MATERIAL HARDSHIP DUE TO LIMITED FINANCIAL RESOURCES, NOT ELSEWHERE CLASSIFIED: ICD-10-CM

## 2025-01-29 DIAGNOSIS — M25.562 CHRONIC PAIN OF LEFT KNEE: Primary | ICD-10-CM

## 2025-01-29 PROCEDURE — 99214 OFFICE O/P EST MOD 30 MIN: CPT | Performed by: FAMILY MEDICINE

## 2025-01-29 RX ORDER — TRAMADOL HYDROCHLORIDE 50 MG/1
50 TABLET ORAL EVERY 6 HOURS PRN
Qty: 20 TABLET | Refills: 0 | Status: SHIPPED | OUTPATIENT
Start: 2025-01-29

## 2025-01-29 SDOH — ECONOMIC STABILITY - INCOME SECURITY: MATERIAL HARDSHIP DUE TO LIMITED FINANCIAL RESOURCES, NOT ELSEWHERE CLASSIFIED: Z59.87

## 2025-01-29 NOTE — PROGRESS NOTES
CHIEF COMPLAINT:   Chief Complaint   Patient presents with    Knee Pain     Patient C/O left knee pain and swelling. Started a few weeks ago         HPI:     Sunday Jo is a 29 year old adult presents for knee pain.    Knee pain: pt has a few months history of chronic knee pain, left.  There are times will treat it, it will get better, but lately is has been persistent pain.  Is described as an achey pain, but also stabbing, and radiates down to the ankle.  Is worse with bending.  Walking hurts, but standing is better.  It is affecting sleep, losing sleep over this.  Denies any previous injury. Has treated with OTC analgesics, stretching, Epson salt soaks, and ice/ heat. Pt is working less hours and also wearing better shoes, but still having pain.  The knee will sometimes get discolored and swelling comes on and off. Pt is wearing an ACE bandage or compression stockings daily.                HISTORY:  Past Medical History:    Allergic rhinitis    Anxiety    Bipolar disorder (HCC)    Celiac disease (HCC)    Depression    Hermaphroditism    mostly hormonal based & underdeveloped reproductive organs    Obesity    Sphincter of Oddi dysfunction      Past Surgical History:   Procedure Laterality Date    Adenoidectomy      Same time as Tonsils    Bile duct endoscopy,intraoperative      had a stent put in at age 20    Cholecystectomy  2009    Colonoscopy  2012    Hc implant ear tubes      age 4    Hysterectomy  2018    complete hysterectomy    Laparoscopic cholecystectomy      age 14    Mastectomy      d/t transgender, age 21    Other surgical history  2016    Double mastectomy w/ nipple grafts    Removal adenoids,primary,12+ y/o      age 10    Tonsillectomy      age 10      Family History   Problem Relation Age of Onset    Schizophrenia Father     Depression Father     Obesity Father     Psychiatric Father     ADHD Mother     Anxiety Mother     Depression Mother     Hypertension Mother     Diabetes Mother      Obesity Mother     Psychiatric Mother     Stroke Mother     Anxiety Maternal Grandmother     Heart Disorder Maternal Grandmother     Hypertension Maternal Grandmother     Psychiatric Maternal Grandmother     Heart Disorder Maternal Grandfather     Cancer Maternal Grandfather     Hypertension Maternal Grandfather     Anxiety Sister     Schizophrenia Sister     Asthma Sister     Depression Sister     Obesity Sister     Psychiatric Sister     PTSD Sister     Anxiety Sister     Depression Sister     Psychiatric Sister     Depression Sister     Anxiety Sister     OCD Sister     Anemia Sister     Obesity Sister     Psychiatric Sister     Heart Disorder Paternal Grandfather     Psychiatric Paternal Grandmother     Psychiatric Brother       Social History:   Social History     Socioeconomic History    Marital status: Single   Tobacco Use    Smoking status: Every Day     Current packs/day: 0.50     Average packs/day: 0.5 packs/day for 14.0 years (7.0 ttl pk-yrs)     Types: Cigarettes     Passive exposure: Never    Smokeless tobacco: Never    Tobacco comments:     I am trying to quit, was able to cut down to half a pack   Vaping Use    Vaping status: Never Used   Substance and Sexual Activity    Alcohol use: Yes     Alcohol/week: 2.0 - 4.0 standard drinks of alcohol     Types: 1 - 3 Glasses of wine, 1 Shots of liquor per week     Comment: It is very sporadic, I go months without touching it    Drug use: Not Currently     Types: Cocaine, Cannabis, benzodiazepines     Comment: None    Sexual activity: Yes     Partners: Female   Other Topics Concern    Caffeine Concern No    Exercise No    Seat Belt No    Special Diet Yes     Comment: I am having weird reactions to food that I didn't before    Stress Concern Yes     Comment: A little bit when school is in session    Weight Concern Yes     Comment: I gained a significant amount of weight from previous meds     Social Drivers of Health     Food Insecurity: No Food Insecurity  (1/29/2025)    NCSS - Food Insecurity     Worried About Running Out of Food in the Last Year: No     Ran Out of Food in the Last Year: No   Transportation Needs: No Transportation Needs (1/29/2025)    NCSS - Transportation     Lack of Transportation: No   Housing Stability: At Risk (1/29/2025)    NCSS - Housing/Utilities     Has Housing: Yes     Worried About Losing Housing: No     Unable to Get Utilities: Yes        Medications (Active prior to today's visit):  Current Outpatient Medications   Medication Sig Dispense Refill    traMADol 50 MG Oral Tab Take 1 tablet (50 mg total) by mouth every 6 (six) hours as needed for Pain. 20 tablet 0    DULoxetine (CYMBALTA) 60 MG Oral Cap DR Particles Take 1 capsule (60 mg total) by mouth daily. 30 capsule 1    gabapentin 400 MG Oral Cap Take 1 capsule (400 mg total) by mouth 3 (three) times daily. 90 capsule 1    hydrOXYzine Pamoate (VISTARIL) 50 MG Oral Cap Take 1 capsule (50 mg total) by mouth 2 (two) times daily as needed for Anxiety (or Insomnia). 90 capsule 1    lamoTRIgine (LAMICTAL) 100 MG Oral Tab Take 1 tablet (100 mg total) by mouth 2 (two) times daily. 60 tablet 1    ibuprofen 600 MG Oral Tab Take 1 tablet (600 mg total) by mouth every 6 (six) hours as needed.      EPINEPHrine (EPIPEN 2-CURTIS) 0.3 MG/0.3ML Injection Solution Auto-injector Inject 0.3 mL (1 each total) into the muscle.      testosterone cypionate 200 mg/mL Intramuscular Solution INJECT 0.4ML INTO THE MUSCLE ONCE WEEKLY (Patient not taking: Reported on 1/29/2025)      Testosterone Cypionate & Prop 200-20 MG/ML Intramuscular Solution Inject 0.3 mg into the muscle once a week. Pt takes on Tuesday's  (Patient not taking: Reported on 1/29/2025)         Allergies:  Allergies[1]    PSFH elements reviewed from today and agreed except as otherwise stated in HPI.  ROS:     Review of Systems   Constitutional:  Negative for chills and fever.   Musculoskeletal:  Positive for arthralgias, gait problem and joint  swelling. Negative for myalgias.   Neurological:  Negative for weakness and numbness.         Pertinent positives and negatives noted in the the HPI.    PHYSICAL EXAM:   /76 (BP Location: Right arm, Patient Position: Sitting, Cuff Size: large)   Pulse 80   Temp 97.2 °F (36.2 °C) (Temporal)   Resp 20   Ht 5' 3\" (1.6 m)   Wt 203 lb 12.8 oz (92.4 kg)   SpO2 98%   BMI 36.10 kg/m²   Vital signs reviewed.Appears stated age, well groomed.  Physical Exam  Vitals reviewed.   Constitutional:       Appearance: Normal appearance.   HENT:      Head: Normocephalic.   Cardiovascular:      Rate and Rhythm: Normal rate and regular rhythm.      Pulses: Normal pulses.      Heart sounds: Normal heart sounds.   Pulmonary:      Effort: Pulmonary effort is normal.      Breath sounds: Normal breath sounds.   Musculoskeletal:         General: Signs of injury present. No swelling, tenderness or deformity. Normal range of motion.      Right lower leg: No edema.      Left lower leg: No edema.      Comments: Neg anterior drawer sign, no palpable edema in politeal fossa. No visible edema or erythema on exam today. No bony abnormality.   Skin:     General: Skin is warm and dry.   Neurological:      General: No focal deficit present.      Mental Status: Religion is alert and oriented to person, place, and time.   Psychiatric:         Behavior: Behavior normal.          LABS     Admission on 12/04/2024, Discharged on 12/04/2024   Component Date Value    Rapid SARS-CoV-2 by PCR 12/04/2024 Not Detected    Admission on 11/30/2024, Discharged on 11/30/2024   Component Date Value    Hold Lavender 11/30/2024 Auto Resulted     Hold Lt Green 11/30/2024 Auto Resulted     Hold Blue 11/30/2024 Auto Resulted     Hold Gold 11/30/2024 Auto Resulted     Glucose 11/30/2024 110 (H)     Sodium 11/30/2024 141     Potassium 11/30/2024 3.7     Chloride 11/30/2024 108     CO2 11/30/2024 22.0     Anion Gap 11/30/2024 11     BUN 11/30/2024 10     Creatinine  11/30/2024 0.85     Calcium, Total 11/30/2024 10.2     Calculated Osmolality 11/30/2024 292     eGFR-Cr 11/30/2024 120     AST 11/30/2024 35 (H)     ALT 11/30/2024 44     Alkaline Phosphatase 11/30/2024 75     Bilirubin, Total 11/30/2024 0.4     Total Protein 11/30/2024 7.6     Albumin 11/30/2024 4.4     Globulin  11/30/2024 3.2     A/G Ratio 11/30/2024 1.4     WBC 11/30/2024 9.1     RBC 11/30/2024 5.28     HGB 11/30/2024 16.0     HCT 11/30/2024 46.2     PLT 11/30/2024 243.0     MCV 11/30/2024 87.5     MCH 11/30/2024 30.3     MCHC 11/30/2024 34.6     RDW 11/30/2024 11.7     Neutrophil Absolute Prel* 11/30/2024 3.80     Neutrophil Absolute 11/30/2024 3.80     Lymphocyte Absolute 11/30/2024 4.22 (H)     Monocyte Absolute 11/30/2024 0.88     Eosinophil Absolute 11/30/2024 0.11     Basophil Absolute 11/30/2024 0.08     Immature Granulocyte Abs* 11/30/2024 0.03     Neutrophil % 11/30/2024 41.7     Lymphocyte % 11/30/2024 46.3     Monocyte % 11/30/2024 9.6     Eosinophil % 11/30/2024 1.2     Basophil % 11/30/2024 0.9     Immature Granulocyte % 11/30/2024 0.3     Lipase 11/30/2024 27     POC Glucose 11/30/2024 108 (H)       REVIEWED THIS VISIT  ASSESSMENT/PLAN:   29 year old adult with    1. Chronic pain of left knee  - unclear if this is due to OA vs soft tissue injury  - order XR Knee, await results  - cont OTC analgesics, but for severe pain, START Tramadol Q6 prn, R/B/SE of new med d/w pt  - IPMP reviewed  - may need MRI if soft tissue injury is suspected    - XR KNEE (1 OR 2 VIEWS), LEFT (CPT=73560); Future  - traMADol 50 MG Oral Tab; Take 1 tablet (50 mg total) by mouth every 6 (six) hours as needed for Pain.  Dispense: 20 tablet; Refill: 0    2. Encounter for tobacco use cessation counseling  - smoking cessation d/w pt, not ready to quit    - Smoking Cessation less than 3 minutes     The patient and provider have a longitudinal relationship to address/treat the serious or   complex condition(s) as stated in this  encounter.     Meds This Visit:  Requested Prescriptions     Signed Prescriptions Disp Refills    traMADol 50 MG Oral Tab 20 tablet 0     Sig: Take 1 tablet (50 mg total) by mouth every 6 (six) hours as needed for Pain.       Health Maintenance:  Health Maintenance   Topic Date Due    Annual Physical  Never done    Tobacco Cessation Counseling  Never done    Pneumococcal Vaccine: Birth to 50yrs (1 of 2 - PCV) 02/28/2025 (Originally 12/20/2012)    COVID-19 Vaccine (4 - 2024-25 season) 02/28/2025 (Originally 9/1/2024)    DTaP,Tdap,and Td Vaccines (2 - Td or Tdap) 05/31/2033    Influenza Vaccine  Completed    Annual Depression Screening  Completed    Meningococcal B Vaccine  Aged Out         Patient/Caregiver Education: There are no barriers to learning. Medical education done.   Outcome: Patient verbalizes understanding and agrees with plan. Advised to call or RTC if symptoms persist or worsen.    Problem List:     Patient Active Problem List   Diagnosis    Bipolar disorder (HCC)    Anxiety disorder, unspecified    Anorexia nervosa, restricting type, severe    Celiac disease (HCC)    Sphincter of Oddi dysfunction    Female-to-male transgender person    Allergic rhinitis    Endometriosis    Esophageal reflux    Migraine    Seizure (HCC)    Viral infection    Protein deficiency disease (HCC)    Drug overdose    COVID-19 virus infection    Chronic back pain    Bipolar 1 disorder, depressed, severe (HCC)       Imaging & Referrals:  XR KNEE (1 OR 2 VIEWS), LEFT (CPT=73560)     1/29/2025  Joanne Fang MD      Patient understands plan and follow-up.  Return for f-u depending on xray results.              [1]   Allergies  Allergen Reactions    Gluten Meal HIVES, DIARRHEA and NAUSEA ONLY    Wasp Venom Protein SHORTNESS OF BREATH    Codeine NAUSEA AND VOMITING    Bee Venom RASH

## 2025-02-01 ENCOUNTER — HOSPITAL ENCOUNTER (OUTPATIENT)
Dept: GENERAL RADIOLOGY | Facility: HOSPITAL | Age: 30
Discharge: HOME | End: 2025-02-01
Attending: FAMILY MEDICINE
Payer: COMMERCIAL

## 2025-02-01 DIAGNOSIS — G89.29 CHRONIC PAIN OF LEFT KNEE: ICD-10-CM

## 2025-02-01 DIAGNOSIS — M25.562 CHRONIC PAIN OF LEFT KNEE: ICD-10-CM

## 2025-02-01 PROCEDURE — 73560 X-RAY EXAM OF KNEE 1 OR 2: CPT | Performed by: FAMILY MEDICINE

## 2025-02-15 ENCOUNTER — PATIENT MESSAGE (OUTPATIENT)
Dept: FAMILY MEDICINE CLINIC | Facility: CLINIC | Age: 30
End: 2025-02-15

## 2025-02-15 DIAGNOSIS — M25.562 CHRONIC PAIN OF LEFT KNEE: Primary | ICD-10-CM

## 2025-02-15 DIAGNOSIS — G89.29 CHRONIC PAIN OF LEFT KNEE: Primary | ICD-10-CM

## 2025-02-24 ENCOUNTER — TELEPHONE (OUTPATIENT)
Dept: ORTHOPEDICS CLINIC | Facility: CLINIC | Age: 30
End: 2025-02-24

## 2025-02-24 NOTE — TELEPHONE ENCOUNTER
Patient scheduled on [facility] with Bridget for the left knee. Please advise if imaging is needed  Future Appointments   Date Time Provider Department Center   2/26/2025  1:00 PM Bridget Trujillo PA [id number]   3/7/2025  9:30 AM Aviva Elliott DO [id number]   3/14/2025  8:30 AM Aretha Woodson APRN [facility]

## 2025-02-24 NOTE — TELEPHONE ENCOUNTER
2/1/25 XR Left knee   FINDINGS:         No evidence of fracture or dislocation      No joint effusion.      Joint spaces are well maintained.      No significant bony abnormalities.         IMPRESSION:      Unremarkable radiographs of the left knee.

## 2025-02-26 ENCOUNTER — OFFICE VISIT (OUTPATIENT)
Dept: ORTHOPEDICS CLINIC | Facility: CLINIC | Age: 30
End: 2025-02-26
Payer: COMMERCIAL

## 2025-02-26 VITALS — WEIGHT: 202 LBS | BODY MASS INDEX: 35.79 KG/M2 | HEIGHT: 63 IN

## 2025-02-26 DIAGNOSIS — M54.16 LUMBAR RADICULITIS: ICD-10-CM

## 2025-02-26 DIAGNOSIS — M76.32 IT BAND SYNDROME, LEFT: ICD-10-CM

## 2025-02-26 DIAGNOSIS — M22.2X2 PATELLOFEMORAL PAIN SYNDROME OF LEFT KNEE: Primary | ICD-10-CM

## 2025-02-26 PROCEDURE — 99213 OFFICE O/P EST LOW 20 MIN: CPT | Performed by: PHYSICIAN ASSISTANT

## 2025-02-26 RX ORDER — METHYLPREDNISOLONE 4 MG/1
TABLET ORAL
Qty: 1 EACH | Refills: 0 | Status: SHIPPED | OUTPATIENT
Start: 2025-02-26

## 2025-02-26 NOTE — H&P
[facility] - ORTHOPEDICS  [address] [place] [id number]  [phone]     NEW PATIENT VISIT - HISTORY AND PHYSICAL EXAMINATION     Name: Sunday Jo   MRN: [id number]  Date: 2/26/2025     CC: Left knee pain.     REFERRED BY: Joanne Fang MD    HPI:   Sunday Jo is a very pleasant 29 year old adult who presents today for evaluation, consultation, and management of left knee and leg pain that has slowly progressed and worsened over the last 3 to 4 months.  The patient does not recall an acute injury or mechanism of trauma.  The patient describes swelling and stiffness.  The patient rates pain to be a 4 out of 10. No recent treatment. The patient notes some occasional tingling and numbness, shooting pain- not extending to foot.     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    Obesity    Sphincter of Oddi dysfunction       PAST SURGICAL HX:  Past Surgical History:   Procedure Laterality Date    Adenoidectomy      Same time as Tonsils    Bile duct endoscopy,intraoperative      had a stent put in at age 20    Cholecystectomy  2009    Colonoscopy  2012    Hc implant ear tubes      age 4    Hysterectomy  2018    complete hysterectomy    Laparoscopic cholecystectomy      age 14    Mastectomy      d/t transgender, age 21    Other surgical history  2016    Double mastectomy w/ nipple grafts    Removal adenoids,primary,12+ y/o      age 10    Tonsillectomy      age 10       FAMILY HX:  Family History   Problem Relation Age of Onset    Schizophrenia Father     Depression Father     Obesity Father     Psychiatric Father     ADHD Mother     Anxiety Mother     Depression Mother     Hypertension Mother     Diabetes Mother      Obesity Mother     Psychiatric Mother     Stroke Mother     Anxiety Maternal Grandmother     Heart Disorder Maternal Grandmother     Hypertension Maternal Grandmother     Psychiatric Maternal Grandmother     Heart Disorder Maternal Grandfather     Cancer Maternal Grandfather     Hypertension Maternal Grandfather     Anxiety Sister     Schizophrenia Sister     Asthma Sister     Depression Sister     Obesity Sister     Psychiatric Sister     PTSD Sister     Anxiety Sister     Depression Sister     Psychiatric Sister     Depression Sister     Anxiety Sister     OCD Sister     Anemia Sister     Obesity Sister     Psychiatric Sister     Heart Disorder Paternal Grandfather     Psychiatric Paternal Grandmother     Psychiatric Brother        ALLERGIES:  Gluten meal, Wasp venom protein, Codeine, and Bee venom    MEDICATIONS:   Current Outpatient Medications   Medication Sig Dispense Refill    methylPREDNISolone (MEDROL) 4 MG Oral Tablet Therapy Pack As directed. 1 each 0    DULoxetine (CYMBALTA) 60 MG Oral Cap DR Particles Take 1 capsule (60 mg total) by mouth daily. 30 capsule 1    gabapentin 400 MG Oral Cap Take 1 capsule (400 mg total) by mouth 3 (three) times daily. 90 capsule 1    hydrOXYzine Pamoate (VISTARIL) 50 MG Oral Cap Take 1 capsule (50 mg total) by mouth 2 (two) times daily as needed for Anxiety (or Insomnia). 90 capsule 1    lamoTRIgine (LAMICTAL) 100 MG Oral Tab Take 1 tablet (100 mg total) by mouth 2 (two) times daily. 60 tablet 1    ibuprofen 600 MG Oral Tab Take 1 tablet (600 mg total) by mouth every 6 (six) hours as needed.      EPINEPHrine (EPIPEN 2-CURTIS) 0.3 MG/0.3ML Injection Solution Auto-injector Inject 0.3 mL (1 each total) into the muscle.      traMADol 50 MG Oral Tab Take 1 tablet (50 mg total) by mouth every 6 (six) hours as needed for Pain. (Patient not taking: Reported on 2/26/2025) 20 tablet 0    testosterone cypionate 200 mg/mL Intramuscular Solution INJECT 0.4ML INTO THE MUSCLE ONCE WEEKLY  (Patient not taking: Reported on 2/26/2025)      Testosterone Cypionate & Prop 200-20 MG/ML Intramuscular Solution Inject 0.3 mg into the muscle once a week. Pt takes on Tuesday's  (Patient not taking: Reported on 2/26/2025)         ROS: A comprehensive 14 point review of systems was performed and was negative aside from the aforementioned per history of present illness.    SOCIAL HX:  Social History     Occupational History    Not on file   Tobacco Use    Smoking status: Every Day     Current packs/day: 0.50     Average packs/day: 0.5 packs/day for 14.0 years (7.0 ttl pk-yrs)     Types: Cigarettes     Passive exposure: Never    Smokeless tobacco: Never    Tobacco comments:     I am trying to quit, was able to cut down to half a pack   Vaping Use    Vaping status: Never Used   Substance and Sexual Activity    Alcohol use: Yes     Alcohol/week: 2.0 - 4.0 standard drinks of alcohol     Types: 1 - 3 Glasses of wine, 1 Shots of liquor per week     Comment: It is very sporadic, I go months without touching it    Drug use: Not Currently     Types: Cocaine, Cannabis, benzodiazepines     Comment: None    Sexual activity: Yes     Partners: Female       PE:   Vitals:    02/26/25 1306   Weight: 202 lb (91.6 kg)   Height: 5' 3\" (1.6 m)     Estimated body mass index is 35.78 kg/m² as calculated from the following:    Height as of this encounter: 5' 3\" (1.6 m).    Weight as of this encounter: 202 lb (91.6 kg).    Physical Exam  Constitutional:       Appearance: Normal appearance.   HENT:      Head: Normocephalic and atraumatic.   Eyes:      Extraocular Movements: Extraocular movements intact.   Neck:      Musculoskeletal: Normal range of motion and neck supple.   Cardiovascular:      Pulses: Normal pulses.   Pulmonary:      Effort: Pulmonary effort is normal. No respiratory distress.   Abdominal:      General: There is no distension.   Skin:     General: Skin is warm.      Capillary Refill: Capillary refill takes less than 2  seconds.      Findings: No bruising.   Neurological:      General: No focal deficit present.      Mental Status: Alert.   Psychiatric:         Mood and Affect: Mood normal.     Examination of the left knee demonstrates:     Skin is intact, warm and dry.   Atrophy: none    Effusion: none    Joint line tenderness: none  Crepitation: none   Beau: Positive   Patellar mobility: normal without apprehension  J-sign: none    ROM: Extension full  Flexion 140 degrees  ACL:  Negative Lachman, Negative Pivot Shift   PCL:  Negative Posterior Drawer  Collateral Ligaments: Stable to Varus and Valgus stress at 0 and 30 degrees  Strength: normal   Hip joint: normal pain-free ROM   Gait:  normal   Leg length: equal and symmetric  Alignment:  neutral     No obvious peripheral edema noted.   Distal neurovascular exam demonstrates normal perfusion, intact sensation to light touch and full strength.     Examination of the contralateral knee demonstrates:  No significant atrophy, swelling or effusion. Full range of motion. Neurovascularly intact distally.    Radiographic Examination/Diagnostics:  I personally viewed, independently interpreted and radiology report was reviewed.      XR KNEE (1 OR 2 VIEWS), LEFT (CPT=73560)    Result Date: 2/1/2025            PROCEDURE:  XR KNEE (1 OR 2 VIEWS), LEFT (CPT=73560)  COMPARISON:  None.  INDICATIONS:  G89.29 Chronic pain of left knee M25.562 Chronic pain of left knee  PATIENT STATED HISTORY: (As transcribed by Technologist)  Medial and lateral left knee pain shooting down to the calf x2 month. No known injury. worsen x2 post fall x2 days     FINDINGS:   No evidence of fracture or dislocation  No joint effusion.  Joint spaces are well maintained.  No significant bony abnormalities.   IMPRESSION:  Unremarkable radiographs of the left knee.   LOCATION:  [facility]   Dictated by (CST): Trung Moreland MD on 2/01/2025 at 9:43 AM     Finalized by (CST): Trung Moreland MD on 2/01/2025 at 9:43 AM          IMPRESSION: Sunday Jo is a 29 year old adult who presents with left knee PFPS/IT band syndrome and evidence of lumbar radiculitis.     PLAN:   We had a detailed discussion outlining the etiology, anatomy, pathophysiology, and natural history of the patient's findings. Imaging was reviewed in detail and correlated to a 3-dimensional model of the patient's pathology.     We reviewed the treatment of this disease condition.  Fortunately, treatment is amenable to conservative treatment which we chose to optimize at today's visit.      We will prescribe a medrol dose pack.   We recommended physical therapy to aid in strengthening, range of motion, functional improvement, and return to baseline activity.  The patient had the opportunity to ask questions and all questions were answered appropriately.      FOLLOW-UP:  Return to clinic on an as needed basis.             Bridget Trujillo [facility], PA-C Orthopedic Surgery / Sports Medicine Specialist  [facility]  [address] [place] [address].org  [email or web address]  t: [phone]  o: [phone]  f: [phone]    This note was dictated using Dragon software.  While it was briefly proofread prior to completion, some grammatical, spelling, and word choice errors due to dictation may still occur.

## 2025-03-16 ENCOUNTER — HOSPITAL ENCOUNTER (EMERGENCY)
Facility: HOSPITAL | Age: 30
Discharge: HOME | End: 2025-03-16
Attending: EMERGENCY MEDICINE
Payer: COMMERCIAL

## 2025-03-16 ENCOUNTER — APPOINTMENT (OUTPATIENT)
Dept: CT IMAGING | Facility: HOSPITAL | Age: 30
End: 2025-03-16
Attending: EMERGENCY MEDICINE
Payer: COMMERCIAL

## 2025-03-16 VITALS
DIASTOLIC BLOOD PRESSURE: 63 MMHG | RESPIRATION RATE: 15 BRPM | BODY MASS INDEX: 36.8 KG/M2 | HEART RATE: 78 BPM | WEIGHT: 200 LBS | HEIGHT: 62 IN | TEMPERATURE: 97 F | SYSTOLIC BLOOD PRESSURE: 109 MMHG | OXYGEN SATURATION: 98 %

## 2025-03-16 DIAGNOSIS — R19.7 NAUSEA VOMITING AND DIARRHEA: Primary | ICD-10-CM

## 2025-03-16 DIAGNOSIS — R11.2 NAUSEA VOMITING AND DIARRHEA: Primary | ICD-10-CM

## 2025-03-16 LAB
ALBUMIN SERPL-MCNC: 4.7 G/DL (ref 3.2–4.8)
ALBUMIN/GLOB SERPL: 2.1 {RATIO} (ref 1–2)
ALP LIVER SERPL-CCNC: 76 U/L
ALT SERPL-CCNC: 50 U/L
ANION GAP SERPL CALC-SCNC: 12 MMOL/L (ref 0–18)
AST SERPL-CCNC: 31 U/L (ref ?–34)
BASOPHILS # BLD AUTO: 0.03 X10(3) UL (ref 0–0.2)
BASOPHILS NFR BLD AUTO: 0.3 %
BILIRUB SERPL-MCNC: 0.5 MG/DL (ref 0.3–1.2)
BUN BLD-MCNC: 14 MG/DL (ref 9–23)
CALCIUM BLD-MCNC: 9.5 MG/DL (ref 8.7–10.6)
CHLORIDE SERPL-SCNC: 108 MMOL/L (ref 98–112)
CO2 SERPL-SCNC: 25 MMOL/L (ref 21–32)
CREAT BLD-MCNC: 0.78 MG/DL
EGFRCR SERPLBLD CKD-EPI 2021: 122 ML/MIN/1.73M2 (ref 60–?)
EOSINOPHIL # BLD AUTO: 0.04 X10(3) UL (ref 0–0.7)
EOSINOPHIL NFR BLD AUTO: 0.4 %
ERYTHROCYTE [DISTWIDTH] IN BLOOD BY AUTOMATED COUNT: 11.8 %
GLOBULIN PLAS-MCNC: 2.2 G/DL (ref 2–3.5)
GLUCOSE BLD-MCNC: 117 MG/DL (ref 70–99)
HCT VFR BLD AUTO: 42.5 %
HGB BLD-MCNC: 14.9 G/DL
IMM GRANULOCYTES # BLD AUTO: 0.03 X10(3) UL (ref 0–1)
IMM GRANULOCYTES NFR BLD: 0.3 %
LIPASE SERPL-CCNC: 30 U/L (ref 12–53)
LYMPHOCYTES # BLD AUTO: 1.8 X10(3) UL (ref 1–4)
LYMPHOCYTES NFR BLD AUTO: 17.3 %
MCH RBC QN AUTO: 30.2 PG (ref 26–34)
MCHC RBC AUTO-ENTMCNC: 35.1 G/DL (ref 31–37)
MCV RBC AUTO: 86.2 FL
MONOCYTES # BLD AUTO: 0.76 X10(3) UL (ref 0.1–1)
MONOCYTES NFR BLD AUTO: 7.3 %
NEUTROPHILS # BLD AUTO: 7.73 X10 (3) UL (ref 1.5–7.7)
NEUTROPHILS # BLD AUTO: 7.73 X10(3) UL (ref 1.5–7.7)
NEUTROPHILS NFR BLD AUTO: 74.4 %
OSMOLALITY SERPL CALC.SUM OF ELEC: 302 MOSM/KG (ref 275–295)
PLATELET # BLD AUTO: 222 10(3)UL (ref 150–450)
POTASSIUM SERPL-SCNC: 3.9 MMOL/L (ref 3.5–5.1)
PROT SERPL-MCNC: 6.9 G/DL (ref 5.7–8.2)
RBC # BLD AUTO: 4.93 X10(6)UL
SODIUM SERPL-SCNC: 145 MMOL/L (ref 136–145)
WBC # BLD AUTO: 10.4 X10(3) UL (ref 4–11)

## 2025-03-16 PROCEDURE — 80053 COMPREHEN METABOLIC PANEL: CPT | Performed by: EMERGENCY MEDICINE

## 2025-03-16 PROCEDURE — 85025 COMPLETE CBC W/AUTO DIFF WBC: CPT | Performed by: EMERGENCY MEDICINE

## 2025-03-16 PROCEDURE — 96375 TX/PRO/DX INJ NEW DRUG ADDON: CPT

## 2025-03-16 PROCEDURE — 80053 COMPREHEN METABOLIC PANEL: CPT

## 2025-03-16 PROCEDURE — 99284 EMERGENCY DEPT VISIT MOD MDM: CPT

## 2025-03-16 PROCEDURE — 99285 EMERGENCY DEPT VISIT HI MDM: CPT

## 2025-03-16 PROCEDURE — 83690 ASSAY OF LIPASE: CPT | Performed by: EMERGENCY MEDICINE

## 2025-03-16 PROCEDURE — 74177 CT ABD & PELVIS W/CONTRAST: CPT | Performed by: EMERGENCY MEDICINE

## 2025-03-16 PROCEDURE — 96374 THER/PROPH/DIAG INJ IV PUSH: CPT

## 2025-03-16 PROCEDURE — 96361 HYDRATE IV INFUSION ADD-ON: CPT

## 2025-03-16 PROCEDURE — 85025 COMPLETE CBC W/AUTO DIFF WBC: CPT

## 2025-03-16 RX ORDER — KETOROLAC TROMETHAMINE 15 MG/ML
15 INJECTION, SOLUTION INTRAMUSCULAR; INTRAVENOUS ONCE
Status: COMPLETED | OUTPATIENT
Start: 2025-03-16 | End: 2025-03-16

## 2025-03-16 RX ORDER — METOCLOPRAMIDE HYDROCHLORIDE 5 MG/ML
10 INJECTION INTRAMUSCULAR; INTRAVENOUS ONCE
Status: COMPLETED | OUTPATIENT
Start: 2025-03-16 | End: 2025-03-16

## 2025-03-16 RX ORDER — DIPHENHYDRAMINE HYDROCHLORIDE 50 MG/ML
25 INJECTION, SOLUTION INTRAMUSCULAR; INTRAVENOUS ONCE
Status: COMPLETED | OUTPATIENT
Start: 2025-03-16 | End: 2025-03-16

## 2025-03-16 RX ORDER — ONDANSETRON 4 MG/1
4 TABLET, ORALLY DISINTEGRATING ORAL EVERY 4 HOURS PRN
Qty: 10 TABLET | Refills: 0 | Status: SHIPPED | OUTPATIENT
Start: 2025-03-16 | End: 2025-03-23

## 2025-03-16 NOTE — ED INITIAL ASSESSMENT (HPI)
Pt arrives via EMS from home with c/o N/V for the past 6 hrs.   Zofran given by medics, denies sick contacts.

## 2025-03-16 NOTE — ED PROVIDER NOTES
Patient Seen in: [facility] Emergency Department      History     Chief Complaint   Patient presents with    Nausea/Vomiting/Diarrhea     Stated Complaint: N/V/D    Subjective:   HPI      This is a 29-year-old male presents emergency room for evaluation of nausea vomiting and diarrhea that started 6 hours prior to arrival, vomit nonbloody nonbilious.  Denies melena or hematochezia.  Denies urinary symptoms.  Denies fever or chills.  States pain is intermittent and crampy.  Denies recent travel or sick contacts.    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    Obesity    Sphincter of Oddi dysfunction              Past Surgical History:   Procedure Laterality Date    Adenoidectomy      Same time as Tonsils    Bile duct endoscopy,intraoperative      had a stent put in at age 20    Cholecystectomy  2009    Colonoscopy  2012    Hc implant ear tubes      age 4    Hysterectomy  2018    complete hysterectomy    Laparoscopic cholecystectomy      age 14    Mastectomy      d/t transgender, age 21    Other surgical history  2016    Double mastectomy w/ nipple grafts    Removal adenoids,primary,12+ y/o      age 10    Tonsillectomy      age 10                Social History     Socioeconomic History    Marital status: Single   Tobacco Use    Smoking status: Every Day     Current packs/day: 0.50     Average packs/day: 0.5 packs/day for 14.0 years (7.0 ttl pk-yrs)     Types: Cigarettes     Passive exposure: Never    Smokeless tobacco: Never    Tobacco comments:     I am trying to quit, was able to cut down to half a pack   Vaping Use    Vaping status: Never Used   Substance and Sexual Activity    Alcohol use: Yes     Alcohol/week: 2.0 - 4.0 standard drinks of alcohol     Types: 1 - 3 Glasses of wine, 1 Shots of liquor per week     Comment: It is very  sporadic, I go months without touching it    Drug use: Not Currently     Types: Cocaine, Cannabis, benzodiazepines     Comment: None    Sexual activity: Yes     Partners: Female   Other Topics Concern    Caffeine Concern No    Exercise No    Seat Belt No    Special Diet Yes     Comment: I am having weird reactions to food that I didn't before    Stress Concern Yes     Comment: A little bit when school is in session    Weight Concern Yes     Comment: I gained a significant amount of weight from previous meds     Social Drivers of Health     Food Insecurity: No Food Insecurity (1/29/2025)    NCSS - Food Insecurity     Worried About Running Out of Food in the Last Year: No     Ran Out of Food in the Last Year: No   Transportation Needs: No Transportation Needs (1/29/2025)    NCSS - Transportation     Lack of Transportation: No   Housing Stability: At Risk (1/29/2025)    NCSS - Housing/Utilities     Has Housing: Yes     Worried About Losing Housing: No     Unable to Get Utilities: Yes                  Physical Exam     ED Triage Vitals   BP 03/16/25 0009 111/66   Pulse 03/16/25 0007 88   Resp 03/16/25 0007 18   Temp 03/16/25 0007 97 °F (36.1 °C)   Temp src 03/16/25 0007 Temporal   SpO2 03/16/25 0007 99 %   O2 Device 03/16/25 0007 None (Room air)       Current Vitals:   Vital Signs  BP: 109/63  Pulse: 78  Resp: 15  Temp: 97 °F (36.1 °C)  Temp src: Temporal  MAP (mmHg): 77    Oxygen Therapy  SpO2: 98 %  O2 Device: None (Room air)        Physical Exam  GENERAL: Patient is awake, alert, in no acute distress.  HEENT:  no scleral icterus.  Mucous membranes are moderately dry.    NECK: Neck is supple, there is no nuchal rigidity.    HEART: Regular rate and rhythm, no murmurs.  LUNGS: Clear to auscultation bilaterally.  No Rales, no rhonchi, no wheezing, no stridor.  ABDOMEN: Soft, nondistended, mild diffuse tender, bowel sounds are present, no rebound, no rigidity, no guarding.no pulsatile masses. No CVA  tenderness  EXTREMITIES: No peripheral edema    ED Course     Labs Reviewed   COMP METABOLIC PANEL (14) - Abnormal; Notable for the following components:       Result Value    Glucose 117 (*)     Calculated Osmolality 302 (*)     A/G Ratio 2.1 (*)     All other components within normal limits   CBC WITH DIFFERENTIAL WITH PLATELET - Abnormal; Notable for the following components:    Neutrophil Absolute Prelim 7.73 (*)     Neutrophil Absolute 7.73 (*)     All other components within normal limits   LIPASE - Normal   RAINBOW DRAW LAVENDER   RAINBOW DRAW LIGHT GREEN   RAINBOW DRAW BLUE   RAINBOW DRAW GOLD     CT ABDOMEN AND PELVIS WITH CONTRAST    COMPARISON: None.    IMPRESSION:  Gastric wall thickening, nonspecific and may be related to under distention or gastritis-correlate clinically.    No appendicitis.  No diverticulitis.  No acute biliary pathology.  No obstructive urolithiasis.  No SBO.              MDM        Differential diagnosis before testing includes but not limited to quite phthisis, cholecystitis, pancreatitis, appendicitis, bowel obstruction, electrolyte abnormality, acute kidney injury, viral syndrome, which is a medical condition that poses a threat to life/function    Past Medical History/comorbidities-celiac disease, history of biliary stent      Radiographic images  I personally reviewed the radiographs and my individual interpretation shows CT abdomen no free air  I also reviewed the official reports that showed Gastric wall thickening, nonspecific and may be related to under distention or gastritis-correlate clinically.    No appendicitis.  No diverticulitis.  No acute biliary pathology.  No obstructive urolithiasis.  No SBO.       Medications Provided: Reglan, Benadryl, Toradol, IV normal saline    Course of Events during Emergency Room Visit include IV established blood work obtained.  CBC was unremarkable, chemistry unremarkable lipase 30.  CT of the abdomen performed without acute findings.   On reevaluation reports he is feel much better.  Abdomen soft nontender nonsurgical.  Patient is tolerating oral fluids.  Discussed all results with patient, suspect viral syndrome as etiology.  Will prescribe Zofran.  Follow-up with primary care, return to ER if any change or symptoms.  Patient well-appearing agrees plan was discharged good condition    Shared decision making was utilized           Disposition:        Discharge  I have discussed with the patient the results of test, differential diagnosis, treatment plan, warning signs and symptoms which should prompt immediate return.  They expressed understanding of these instructions and agrees to the following plan provided.  They were given written discharge instructions and agrees to return for any concerns and voiced understanding and all questions were answered.    Note to patient: The 21st Century Cures Act makes medical notes like these available to patients in the interest of transparency. However, this is a medical document intended as peer to peer communication. It is written in medical language and may contain abbreviations or verbiage that are unfamiliar. It may appear blunt or direct. Medical documents are intended to carry relevant information, facts as evident, and the clinical opinion of the practitioner.                 Medical Decision Making      Disposition and Plan     Clinical Impression:  1. Nausea vomiting and diarrhea         Disposition:  Discharge  3/16/2025  2:39 am    Follow-up:  Joanne Fang MD  [address]  [facility] [address]  [phone]    Follow up in 2 day(s)            Medications Prescribed:  Discharge Medication List as of 3/16/2025  2:57 AM        START taking these medications    Details   ondansetron 4 MG Oral Tablet Dispersible Take 1 tablet (4 mg total) by mouth every 4 (four) hours as needed for Nausea., Normal, Disp-10 tablet, R-0                 Supplementary Documentation:

## 2025-03-31 ENCOUNTER — HOSPITAL ENCOUNTER (OUTPATIENT)
Age: 30
Discharge: HOME | End: 2025-03-31
Payer: COMMERCIAL

## 2025-03-31 VITALS
DIASTOLIC BLOOD PRESSURE: 74 MMHG | OXYGEN SATURATION: 96 % | RESPIRATION RATE: 20 BRPM | HEART RATE: 88 BPM | SYSTOLIC BLOOD PRESSURE: 103 MMHG | TEMPERATURE: 98 F

## 2025-03-31 DIAGNOSIS — J32.9 RHINOSINUSITIS: Primary | ICD-10-CM

## 2025-03-31 PROCEDURE — U0002 COVID-19 LAB TEST NON-CDC: HCPCS | Performed by: PHYSICIAN ASSISTANT

## 2025-03-31 PROCEDURE — 87502 INFLUENZA DNA AMP PROBE: CPT | Performed by: PHYSICIAN ASSISTANT

## 2025-03-31 PROCEDURE — 99214 OFFICE O/P EST MOD 30 MIN: CPT | Performed by: PHYSICIAN ASSISTANT

## 2025-03-31 RX ORDER — DEXTROMETHORPHAN HBR, GUAIFENESIN 60; 1200 MG/1; MG/1
1 TABLET, EXTENDED RELEASE ORAL EVERY 12 HOURS
Qty: 12 TABLET | Refills: 0 | Status: SHIPPED | OUTPATIENT
Start: 2025-03-31

## 2025-03-31 RX ORDER — FLUTICASONE PROPIONATE 50 MCG
2 SPRAY, SUSPENSION (ML) NASAL DAILY
Qty: 16 G | Refills: 0 | Status: SHIPPED | OUTPATIENT
Start: 2025-03-31 | End: 2025-04-30

## 2025-03-31 NOTE — DISCHARGE INSTRUCTIONS
Take decongestant and use nasal spray as directed     Drink plenty of water and get plenty of rest   You may benefit from taking a decongestant (e.g. Sudafed) and nasal spray (e.g. Flonase)  You may benefit from taking a daily allergy medication (e.g. Zyrtec)  You may benefit from using a humidifier    Alternate Tylenol (acetaminophen) and Advil (ibuprofen) every 3 hours for pain or fever > 100.4 degrees    Sleep with head elevated and avoid laying flat  Avoid having air blow on your face    Wash hands often  Disinfect your environment  Do not share utensils or drinks    Symptoms may take a few weeks to resolve  Follow up with your primary care provider

## 2025-03-31 NOTE — ED PROVIDER NOTES
Chief Complaint   Patient presents with    Cough/URI       History obtained from: patient   services not used    HPI:     Sunday Jo is a 29 year old adult who presents with right ear pain starting today. Patient reports runny nose, nasal congestion, cough, and low-grade fevers for the pat few days. Patient taking NyQuil for symptoms. Denies chest pain, shortness of breath, wheezing, sore throat, headache, neck stiffness, abdominal pain, vomiting, rash.     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  Nurses notes reviewed I agree with documentation.    Family History   Problem Relation Age of Onset    Schizophrenia Father     Depression Father     Obesity Father     Psychiatric Father     ADHD Mother     Anxiety Mother     Depression Mother     Hypertension Mother     Diabetes Mother     Obesity Mother     Psychiatric Mother     Stroke Mother     Anxiety Maternal Grandmother     Heart Disorder Maternal Grandmother     Hypertension Maternal Grandmother     Psychiatric Maternal Grandmother     Heart Disorder Maternal Grandfather     Cancer Maternal Grandfather     Hypertension Maternal Grandfather     Anxiety Sister     Schizophrenia Sister     Asthma Sister     Depression Sister     Obesity Sister     Psychiatric Sister     PTSD Sister     Anxiety Sister     Depression Sister     Psychiatric Sister     Depression Sister     Anxiety Sister     OCD Sister     Anemia Sister     Obesity Sister     Psychiatric Sister     Heart Disorder Paternal Grandfather     Psychiatric Paternal Grandmother     Psychiatric Brother      Family history reviewed with patient/caregiver and is not pertinent to presenting problem.  Social History      Socioeconomic History    Marital status: Single     Spouse name: Not on file    Number of children: Not on file    Years of education: Not on file    Highest education level: Not on file   Occupational History    Not on file   Tobacco Use    Smoking status: Every Day     Current packs/day: 0.50     Average packs/day: 0.5 packs/day for 14.0 years (7.0 ttl pk-yrs)     Types: Cigarettes     Passive exposure: Never    Smokeless tobacco: Never    Tobacco comments:     I am trying to quit, was able to cut down to half a pack   Vaping Use    Vaping status: Never Used   Substance and Sexual Activity    Alcohol use: Yes     Alcohol/week: 2.0 - 4.0 standard drinks of alcohol     Types: 1 - 3 Glasses of wine, 1 Shots of liquor per week     Comment: It is very sporadic, I go months without touching it    Drug use: Yes     Types: Cannabis     Comment: None    Sexual activity: Yes     Partners: Female   Other Topics Concern    Caffeine Concern No    Exercise No    Seat Belt No    Special Diet Yes     Comment: I am having weird reactions to food that I didn't before    Stress Concern Yes     Comment: A little bit when school is in session    Weight Concern Yes     Comment: I gained a significant amount of weight from previous meds   Social History Narrative    Not on file     Social Drivers of Health     Food Insecurity: No Food Insecurity (1/29/2025)    NCSS - Food Insecurity     Worried About Running Out of Food in the Last Year: No     Ran Out of Food in the Last Year: No   Transportation Needs: No Transportation Needs (1/29/2025)    NCSS - Transportation     Lack of Transportation: No   Housing Stability: At Risk (1/29/2025)    NCSS - Housing/Utilities     Has Housing: Yes     Worried About Losing Housing: No     Unable to Get Utilities: Yes         ROS:   Positive for stated complaint: right ear pain, nasal congestion, cough, low-grade fever   Other systems are as noted in HPI.   All other systems reviewed and negative  except as noted above.    Physical Exam:   Vital signs and nursing note reviewed.       /74   Pulse 88   Temp 98.2 °F (36.8 °C) (Oral)   Resp 20   SpO2 96%     GENERAL: well developed, no acute distress, non-toxic appearing   SKIN: good skin turgor, no obvious rashes  HEAD: normocephalic, atraumatic  EYES: sclera non-icteric bilaterally, conjunctiva clear bilaterally  EARS: canals clear bilaterally, TMs clear bilaterally  NOSE: nasal congestion, clear rhinorrhea   OROPHARYNX: MMM, pharynx clear, no exudates or swelling, uvula midline, no tongue elevation, maintaining airway and secretions  NECK: supple, no lymphadenopathy, no nuchal rigidity, no trismus, no edema, phonation normal    CARDIO: RRR, normal heart sounds   LUNGS: clear to auscultation bilaterally, no increased WOB, no rales, rhonchi, or wheezes  EXTREMITIES: no cyanosis or edema, BALTAZAR without difficulty  NEURO: no focal deficits  PSYCH: alert and oriented x3, answering questions appropriately, mood appropriate    MDM/Assessment/Plan:   Orders for this encounter:    Orders Placed This Encounter    POCT Flu Test     Order Specific Question:   Release to patient     Answer:   Immediate    Rapid SARS-CoV-2 by PCR     Order Specific Question:   Release to patient     Answer:   Immediate    dextromethorphan-guaifenesin ER (MUCINEX DM MAXIMUM STRENGTH)  MG Oral Tablet 12 Hr     Sig: Take 1 tablet by mouth every 12 (twelve) hours.     Dispense:  12 tablet     Refill:  0    fluticasone propionate 50 MCG/ACT Nasal Suspension     Si sprays by Nasal route daily.     Dispense:  16 g     Refill:  0       Labs performed this visit:  Recent Results (from the past 10 hours)   POCT Flu Test    Collection Time: 25 10:43 AM    Specimen: Nares; Other   Result Value Ref Range    POCT INFLUENZA A Negative Negative    POCT INFLUENZA B Negative Negative   Rapid SARS-CoV-2 by PCR    Collection Time: 25 10:43 AM    Specimen: Nares; Other   Result  Value Ref Range    Rapid SARS-CoV-2 by PCR Not Detected Not Detected       Imaging performed this visit:  No orders to display       Medical Decision Making  DDx includes viral URI versus covid versus flu versus rhinosinusitis versus otalgia versus other. Patient is overall very well-appearing with stable vitals and tolerating oral intake. No hypoxia or signs of respiratory distress. No signs of ear infection. Covid and flu tests negative. Discussed supportive care for suspected viral illness including rest, increased fluid intake, using a humidifier, and OTC Tylenol/Motrin as needed for pain or fevers.  Rx Mucinex and Flonase for symptom management.  Discussed infection control.  Instructed patient to go directly to nearest ER with any worsening or concerning symptoms.  Follow-up with PCP.    Amount and/or Complexity of Data Reviewed  Labs: ordered.    Risk  OTC drugs.  Prescription drug management.          Diagnosis:    ICD-10-CM    1. Rhinosinusitis  J32.9           All results reviewed and discussed with patient/patient's family. Patient/patient's family verbalize excellent understanding of instructions and feels comfortable with plan. All of patient's/patient's family's questions were addressed.   See AVS for detailed discharge instructions for your condition today.    Follow Up with:  Joanne Fang MD  [address]  [facility]  [facility] [address]  [phone]            Note: This document was dictated using Dragon medical dictation software.  Proofreading was performed to the best of my ability, but errors may be present.    Kayleigh Jones PA-C

## 2025-03-31 NOTE — ED INITIAL ASSESSMENT (HPI)
Pt has a lot of nasal; congestion which began 3 days ago but developed ear pain today   Covid negative 2 days ago.  Lo grade fever

## 2025-04-10 ENCOUNTER — OFFICE VISIT (OUTPATIENT)
Facility: CLINIC | Age: 30
End: 2025-04-10
Payer: COMMERCIAL

## 2025-04-10 VITALS
HEIGHT: 63 IN | BODY MASS INDEX: 37.39 KG/M2 | DIASTOLIC BLOOD PRESSURE: 76 MMHG | HEART RATE: 111 BPM | SYSTOLIC BLOOD PRESSURE: 124 MMHG | WEIGHT: 211 LBS | OXYGEN SATURATION: 98 %

## 2025-04-10 DIAGNOSIS — Z78.9 FEMALE-TO-MALE TRANSGENDER PERSON: Primary | ICD-10-CM

## 2025-04-10 PROCEDURE — 99204 OFFICE O/P NEW MOD 45 MIN: CPT | Performed by: STUDENT IN AN ORGANIZED HEALTH CARE EDUCATION/TRAINING PROGRAM

## 2025-04-10 RX ORDER — TESTOSTERONE CYPIONATE 200 MG/ML
60 INJECTION, SOLUTION INTRAMUSCULAR
Qty: 1 ML | Refills: 2 | Status: SHIPPED | OUTPATIENT
Start: 2025-04-10 | End: 2025-06-19

## 2025-04-10 NOTE — PATIENT INSTRUCTIONS
Visit Summary  We will start testosterone injections 0.3 ml every 7 days  Please repeat your labs on day 3-4 of your cycle in around 3 months (7/10/2025 onwards)  I will be in touch with next steps  If you choose to switch to every 2 weeks for testosterone, please let me know at that time  I will plan on seeing you back around 10/2025 onwards with labs prior     General follow up information:  Please let us know if you require any refills at least 1 week prior to your medication running out. If you do run out of medication, please call our office ASAP to request refills (do not wait until your follow up).  Please call us if you experience any problems with insurance coverage of medication, lab work, or imaging.   Lab results and imaging will typically be reviewed at follow up appointments, or within 3-5 business days of ALL results being in if you do not have an appointment scheduled in the near future. Our office will contact you for any abnormal results requiring more urgent follow up or action.  The on-call pager is for urgent matters only. If you are a type 1 diabetic and run out of insulin after business hours 8AM-4PM, you may call the on-call pager for a refill to a 24 hour pharmacy. If you have adrenal insufficiency and run out of steroids, you may call the on-call pager for a refill to a 24 hours pharmacy. All other refill requests should be requested during business hours.    Return Visit   [x] Dr. Elliott in 6 months   [] Virtual visit  [] No follow up appointment needed  [] Follow up to be scheduled pending      []  Fasting/8AM labs  []  Central scheduling # for ultrasound/nuclear med/CT/MRI/DXA/IR  []  Provide flyer for:  [] ENT   [] Weight Management  [] Infertility/Reproductive Endocrinology  [] Transgender care  []  Directions to 1st floor lab  [] Collect urine collection jug  [] Collect salivary cortisol tubes  []  Dental clearance form  []  Will need authorization for outside records

## 2025-04-11 ENCOUNTER — TELEPHONE (OUTPATIENT)
Facility: CLINIC | Age: 30
End: 2025-04-11

## 2025-04-11 DIAGNOSIS — Z78.9 FEMALE-TO-MALE TRANSGENDER PERSON: Primary | ICD-10-CM

## 2025-04-11 NOTE — TELEPHONE ENCOUNTER
PA Initiated through M  Testosterone Cypionate 200MG/ML intramuscular solution  Sent to Plan , Awaiting Determination     (Key: [id number])  PA Case ID #: [id number]  Rx #: [id number]

## 2025-04-11 NOTE — TELEPHONE ENCOUNTER
Favorable outcome   PA APPROVED     Approved today by [employer] 2017  CaseId:[id number];Status:Approved;Review Type:Prior Auth;Coverage Start Date:03/12/2025;Coverage End Date:04/11/2026    Closing Encounter

## 2025-04-11 NOTE — TELEPHONE ENCOUNTER
[facility] receive Rx for the Syringe to draw up testosterone,  but they need a rx for the 22G to inject testosterone into muscle every week according to the SIG

## 2025-04-12 ENCOUNTER — HOSPITAL ENCOUNTER (OUTPATIENT)
Age: 30
Discharge: HOME | End: 2025-04-12
Payer: COMMERCIAL

## 2025-04-12 VITALS
DIASTOLIC BLOOD PRESSURE: 93 MMHG | HEART RATE: 96 BPM | TEMPERATURE: 98 F | HEIGHT: 63 IN | BODY MASS INDEX: 37.39 KG/M2 | OXYGEN SATURATION: 100 % | WEIGHT: 211 LBS | RESPIRATION RATE: 18 BRPM | SYSTOLIC BLOOD PRESSURE: 126 MMHG
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
## 2025-04-12 PROCEDURE — 99213 OFFICE O/P EST LOW 20 MIN: CPT | Performed by: PHYSICIAN ASSISTANT

## 2025-04-12 RX ORDER — CYCLOBENZAPRINE HCL 5 MG
5 TABLET ORAL 3 TIMES DAILY PRN
Qty: 15 TABLET | Refills: 0 | Status: SHIPPED | OUTPATIENT
Start: 2025-04-12
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
## 2025-04-12 NOTE — DISCHARGE INSTRUCTIONS
You may take the prescribed muscle relaxant, Flexeril, 3 times daily as needed for muscle spasms.  Do NOT drive or operate heavy machinery while taking this medication as it causes fatigue.
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
## 2025-04-13 RX ORDER — NEEDLES, DISPOSABLE 25GX5/8"
NEEDLE, DISPOSABLE MISCELLANEOUS
Qty: 12 EACH | Refills: 2 | Status: SHIPPED | OUTPATIENT
Start: 2025-04-13 | End: 2025-04-14 | Stop reason: ALTCHOICE

## 2025-04-14 NOTE — TELEPHONE ENCOUNTER
Alexa just called in stating they don't have 1 inch can you send new rx for 22g 1.5 inch needle.   Thanks

## 2025-04-18 ENCOUNTER — OFFICE VISIT (OUTPATIENT)
Dept: FAMILY MEDICINE CLINIC | Facility: CLINIC | Age: 30
End: 2025-04-18
Payer: COMMERCIAL

## 2025-04-18 VITALS
WEIGHT: 208 LBS | HEIGHT: 63 IN | DIASTOLIC BLOOD PRESSURE: 74 MMHG | HEART RATE: 103 BPM | OXYGEN SATURATION: 99 % | SYSTOLIC BLOOD PRESSURE: 120 MMHG | TEMPERATURE: 97 F | RESPIRATION RATE: 18 BRPM | BODY MASS INDEX: 36.86 KG/M2

## 2025-04-18 DIAGNOSIS — M54.9 ACUTE MID BACK PAIN: ICD-10-CM
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
## 2025-04-18 DIAGNOSIS — M54.42 ACUTE BILATERAL LOW BACK PAIN WITH LEFT-SIDED SCIATICA: ICD-10-CM

## 2025-04-18 DIAGNOSIS — M62.830 BACK MUSCLE SPASM: ICD-10-CM

## 2025-04-18 PROCEDURE — 99214 OFFICE O/P EST MOD 30 MIN: CPT | Performed by: FAMILY MEDICINE

## 2025-04-18 RX ORDER — METHYLPREDNISOLONE 4 MG/1
TABLET ORAL
Qty: 1 EACH | Refills: 0 | Status: SHIPPED | OUTPATIENT
Start: 2025-04-18

## 2025-04-18 RX ORDER — CYCLOBENZAPRINE HCL 5 MG
5 TABLET ORAL 3 TIMES DAILY PRN
Qty: 15 TABLET | Refills: 0 | Status: SHIPPED | OUTPATIENT
Start: 2025-04-18

## 2025-04-19 ENCOUNTER — HOSPITAL ENCOUNTER (OUTPATIENT)
Dept: CT IMAGING | Age: 30
Discharge: HOME | End: 2025-04-19
Attending: FAMILY MEDICINE
Payer: COMMERCIAL
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
## 2025-04-19 DIAGNOSIS — M54.42 ACUTE BILATERAL LOW BACK PAIN WITH LEFT-SIDED SCIATICA: ICD-10-CM

## 2025-04-19 DIAGNOSIS — M54.9 ACUTE MID BACK PAIN: ICD-10-CM

## 2025-04-19 DIAGNOSIS — M62.830 BACK MUSCLE SPASM: ICD-10-CM

## 2025-04-19 PROCEDURE — 72128 CT CHEST SPINE W/O DYE: CPT | Performed by: FAMILY MEDICINE

## 2025-04-19 PROCEDURE — 72131 CT LUMBAR SPINE W/O DYE: CPT | Performed by: FAMILY MEDICINE

## 2025-04-22 ENCOUNTER — PATIENT MESSAGE (OUTPATIENT)
Dept: FAMILY MEDICINE CLINIC | Facility: CLINIC | Age: 30
End: 2025-04-22

## 2025-04-22 ENCOUNTER — TELEPHONE (OUTPATIENT)
Dept: FAMILY MEDICINE CLINIC | Facility: CLINIC | Age: 30
End: 2025-04-22

## 2025-04-22 ENCOUNTER — TELEPHONE (OUTPATIENT)
Dept: ADMINISTRATIVE | Age: 30
End: 2025-04-22

## 2025-04-22 NOTE — TELEPHONE ENCOUNTER
CT- Thoracic --If the ordering provider would like to discuss this case with a reviewer, contact [facility]  at [phone]  .  Use reference Case Number: [id number]         CT Lumbar- Please contact [facility] at [phone] and refer to the case # [id number]       I called [facility] and they state provider will need to do a peer-peer- call [phone] option #4 to schedule a peer-peer

## 2025-04-22 NOTE — TELEPHONE ENCOUNTER
Can you please bring to Gregoria's attention.  To ask her why they would send this AFTER he completed the CT scans to ask for approval?

## 2025-04-22 NOTE — TELEPHONE ENCOUNTER
Received this message today stating CT not covered but looks like Pt already had CT done on 4/19/25

## 2025-04-22 NOTE — TELEPHONE ENCOUNTER
CT SPINE LUMBAR (CPT=72131)     Urgent Case   Exam completed 04/19/2025       Always Prepped for status, alternative code ( 47470) provided from the health plan      Case Service Order:[id number]    Rational : Your doctor told us that you have pain in your leg(s) and/or feet that may be coming from your spine. The request cannot be approved because:    The type of picture study requested is not supported for your reported condition. The type of picture study(ies) supported is listed in the guideline.  A different study (05371-Aztmkmjr Resonance Imaging (MRI) L Spine without Contrast, a special picture study of the lower part of the spine without injected dye) is likely to show your doctor all of the details they need to see in order to treat you. This study will be approved if requested.      This finding was based on review of [employer] Sp[facility] Imaging Guidelines Section(s): Lower Extremity Pain with Neurological Features (Radiculopathy, Radiculitis, or Plexopathy and Neuropathy) with or without Low Back (Lumbar Spine) Pain (SP 6.1) and Preface to the Imaging Guidelines, section Preface-3 Clinical Information.    Notified clinical staff for followup    Please contact [employer] at [phone] and refer to the case # [id number]

## 2025-04-25 ENCOUNTER — OFFICE VISIT (OUTPATIENT)
Dept: FAMILY MEDICINE CLINIC | Facility: CLINIC | Age: 30
End: 2025-04-25
Payer: COMMERCIAL

## 2025-04-25 VITALS
WEIGHT: 208 LBS | DIASTOLIC BLOOD PRESSURE: 74 MMHG | SYSTOLIC BLOOD PRESSURE: 116 MMHG | OXYGEN SATURATION: 99 % | BODY MASS INDEX: 36.86 KG/M2 | RESPIRATION RATE: 18 BRPM | HEART RATE: 91 BPM | TEMPERATURE: 98 F | HEIGHT: 63 IN

## 2025-04-25 DIAGNOSIS — M62.830 BACK MUSCLE SPASM: ICD-10-CM
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
## 2025-04-25 DIAGNOSIS — M54.42 ACUTE BILATERAL LOW BACK PAIN WITH LEFT-SIDED SCIATICA: ICD-10-CM

## 2025-04-25 PROCEDURE — 99213 OFFICE O/P EST LOW 20 MIN: CPT | Performed by: FAMILY MEDICINE

## 2025-04-25 NOTE — TELEPHONE ENCOUNTER
Called patient to obtain details,           Type of Leave: continuous fmla  Reason for Leave:back pain   Start date of leave: 4/19/25  End date of leave:5/9/25 rtw 5/10/25  How many flare ups per month/length?:n  How many appts per month/length?: n  Was Fee and Turnaround info Given?: y
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
## 2025-04-25 NOTE — TELEPHONE ENCOUNTER
Family Medical Leave Act forms received in forms dept. Logged for processing. [employer] message sent for Release of Information.

## 2025-04-25 NOTE — TELEPHONE ENCOUNTER
Sunday Wilkins was in for appt today, we discussed CHANGING Return to Work date to 5/09/25, given pt was referred to Physical Therapy at today's appt.    Please let me know if any questions. Thanks.
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
## 2025-04-28 NOTE — TELEPHONE ENCOUNTER
Dr. Fang    Please sign off on form if you agree to: disability for back pain    -Signature page will be the first page scanned  -From your Inbasket, Highlight the patient and click Chart   -Double click the 4/22/25 Forms Completion telephone encounter  -Scroll down to the Media section   -Click the blue Hyperlink: disability Dr. Fang 4/28/25  -Click Acknowledge located in the top right ribbon/menu   -Drag the mouse into the blank space of the document and a + sign will appear. Left click to   electronically sign the document.  -Once signed, simply exit out of the screen and you signature will be saved.     Thank you,  Angela

## 2025-04-29 ENCOUNTER — TELEPHONE (OUTPATIENT)
Dept: PHYSICAL THERAPY | Facility: HOSPITAL | Age: 30
End: 2025-04-29

## 2025-04-30 ENCOUNTER — OFFICE VISIT (OUTPATIENT)
Facility: LOCATION | Age: 30
End: 2025-04-30
Attending: FAMILY MEDICINE
Payer: COMMERCIAL
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
## 2025-04-30 DIAGNOSIS — M54.42 ACUTE BILATERAL LOW BACK PAIN WITH LEFT-SIDED SCIATICA: ICD-10-CM

## 2025-04-30 DIAGNOSIS — M62.830 BACK MUSCLE SPASM: ICD-10-CM

## 2025-04-30 PROCEDURE — 97162 PT EVAL MOD COMPLEX 30 MIN: CPT

## 2025-04-30 PROCEDURE — 97014 ELECTRIC STIMULATION THERAPY: CPT

## 2025-04-30 PROCEDURE — 97110 THERAPEUTIC EXERCISES: CPT
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
## 2025-05-05 ENCOUNTER — TELEPHONE (OUTPATIENT)
Dept: FAMILY MEDICINE CLINIC | Facility: CLINIC | Age: 30
End: 2025-05-05

## 2025-05-05 NOTE — TELEPHONE ENCOUNTER
Patient is calling about his return to work paperwork. Patient said that he needs that 5 days prior to returning.  Forms were in nurses bin

## 2025-05-05 NOTE — TELEPHONE ENCOUNTER
Form placed at desk for Dr. Fang to fill out and sign.    Pt. is returning to work on 05/09/25 and needs form completed at least 5 days prior to going back.

## 2025-05-05 NOTE — TELEPHONE ENCOUNTER
LMTCB for pt. to provide fax number to be able to fax over completed form.   Unable to find fax number on the form.

## 2025-05-05 NOTE — TELEPHONE ENCOUNTER
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  Email to [email or web address]

## 2025-05-06 ENCOUNTER — OFFICE VISIT (OUTPATIENT)
Facility: LOCATION | Age: 30
End: 2025-05-06
Attending: FAMILY MEDICINE
Payer: COMMERCIAL

## 2025-05-06 PROCEDURE — 97110 THERAPEUTIC EXERCISES: CPT

## 2025-05-06 PROCEDURE — 97014 ELECTRIC STIMULATION THERAPY: CPT
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
## 2025-05-13 ENCOUNTER — OFFICE VISIT (OUTPATIENT)
Facility: LOCATION | Age: 30
End: 2025-05-13
Attending: FAMILY MEDICINE
Payer: COMMERCIAL

## 2025-05-13 PROCEDURE — 97110 THERAPEUTIC EXERCISES: CPT
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
## 2025-05-15 ENCOUNTER — OFFICE VISIT (OUTPATIENT)
Facility: LOCATION | Age: 30
End: 2025-05-15
Attending: FAMILY MEDICINE
Payer: COMMERCIAL

## 2025-05-15 PROCEDURE — 97014 ELECTRIC STIMULATION THERAPY: CPT

## 2025-05-15 PROCEDURE — 97110 THERAPEUTIC EXERCISES: CPT
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
## 2025-05-20 ENCOUNTER — APPOINTMENT (OUTPATIENT)
Facility: LOCATION | Age: 30
End: 2025-05-20
Attending: FAMILY MEDICINE
Payer: COMMERCIAL

## 2025-05-22 ENCOUNTER — OFFICE VISIT (OUTPATIENT)
Facility: LOCATION | Age: 30
End: 2025-05-22
Attending: FAMILY MEDICINE
Payer: COMMERCIAL

## 2025-05-22 PROCEDURE — 97110 THERAPEUTIC EXERCISES: CPT

## 2025-05-22 PROCEDURE — 97140 MANUAL THERAPY 1/> REGIONS: CPT
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
## 2025-05-27 ENCOUNTER — OFFICE VISIT (OUTPATIENT)
Facility: LOCATION | Age: 30
End: 2025-05-27
Attending: FAMILY MEDICINE
Payer: COMMERCIAL

## 2025-05-27 PROCEDURE — 97110 THERAPEUTIC EXERCISES: CPT
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
## 2025-06-03 ENCOUNTER — OFFICE VISIT (OUTPATIENT)
Facility: LOCATION | Age: 30
End: 2025-06-03
Attending: FAMILY MEDICINE
Payer: COMMERCIAL

## 2025-06-03 PROCEDURE — 97110 THERAPEUTIC EXERCISES: CPT
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
## 2025-06-10 ENCOUNTER — APPOINTMENT (OUTPATIENT)
Facility: LOCATION | Age: 30
End: 2025-06-10
Attending: FAMILY MEDICINE
Payer: COMMERCIAL

## 2025-06-12 ENCOUNTER — OFFICE VISIT (OUTPATIENT)
Facility: LOCATION | Age: 30
End: 2025-06-12
Attending: FAMILY MEDICINE
Payer: COMMERCIAL

## 2025-06-12 PROCEDURE — 97110 THERAPEUTIC EXERCISES: CPT
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
## 2025-07-17 ENCOUNTER — OFFICE VISIT (OUTPATIENT)
Dept: FAMILY MEDICINE CLINIC | Facility: CLINIC | Age: 30
End: 2025-07-17
Payer: COMMERCIAL

## 2025-07-17 VITALS
HEART RATE: 93 BPM | OXYGEN SATURATION: 98 % | SYSTOLIC BLOOD PRESSURE: 114 MMHG | HEIGHT: 63 IN | DIASTOLIC BLOOD PRESSURE: 80 MMHG | WEIGHT: 220.63 LBS | TEMPERATURE: 97 F | RESPIRATION RATE: 18 BRPM | BODY MASS INDEX: 39.09 KG/M2

## 2025-07-17 DIAGNOSIS — R50.9 FEVER, UNSPECIFIED FEVER CAUSE: ICD-10-CM

## 2025-07-17 DIAGNOSIS — J02.9 ACUTE PHARYNGITIS, UNSPECIFIED ETIOLOGY: ICD-10-CM

## 2025-07-17 DIAGNOSIS — B27.90 INFECTIOUS MONONUCLEOSIS WITHOUT COMPLICATION, INFECTIOUS MONONUCLEOSIS DUE TO UNSPECIFIED ORGANISM: Primary | ICD-10-CM

## 2025-07-17 PROBLEM — B34.9 VIRAL INFECTION: Status: RESOLVED | Noted: 2024-02-17 | Resolved: 2025-07-17

## 2025-07-17 PROBLEM — F31.4 BIPOLAR 1 DISORDER, DEPRESSED, SEVERE (HCC): Status: RESOLVED | Noted: 2020-08-22 | Resolved: 2025-07-17

## 2025-07-17 PROBLEM — K90.0 CELIAC DISEASE (HCC): Status: RESOLVED | Noted: 2019-05-02 | Resolved: 2025-07-17

## 2025-07-17 LAB
CONTROL LINE PRESENT WITH A CLEAR BACKGROUND (YES/NO): YES YES/NO
CONTROL LINE PRESENT WITH A CLEAR BACKGROUND (YES/NO): YES YES/NO
KIT LOT #: NORMAL NUMERIC
KIT LOT #: NORMAL NUMERIC
MONONUCLEOSIS TEST, QUAL: POSITIVE
STREP GRP A CUL-SCR: NEGATIVE

## 2025-07-17 PROCEDURE — 99213 OFFICE O/P EST LOW 20 MIN: CPT | Performed by: FAMILY MEDICINE

## 2025-07-17 PROCEDURE — 86308 HETEROPHILE ANTIBODY SCREEN: CPT | Performed by: FAMILY MEDICINE

## 2025-07-17 PROCEDURE — 87880 STREP A ASSAY W/OPTIC: CPT | Performed by: FAMILY MEDICINE

## 2025-07-17 RX ORDER — NEEDLES, DISPOSABLE 25GX5/8"
NEEDLE, DISPOSABLE MISCELLANEOUS
COMMUNITY
Start: 2025-04-14

## 2025-07-17 NOTE — PROGRESS NOTES
The following individual(s) verbally consented to be recorded using ambient AI listening technology and understand that they can each withdraw their consent to this listening technology at any point by asking the clinician to turn off or pause the recording:    Patient name: Sunday Jo

## 2025-07-17 NOTE — PROGRESS NOTES
CHIEF COMPLAINT:   Chief Complaint   Patient presents with    Fatigue    Headache    Rash    Sore Throat         HPI:     Sunday Jo is a 29 year old adult presents for fatigue, sore throat.    HPI    History of Present Illness  Sunday Jo is a 29 year old who presents with severe fatigue and muscle soreness.    He experiences severe fatigue and muscle soreness for the past week, despite sleeping 15-16 hours on days off and 11 hours on workdays. He also has a persistent headache, sore throat, and a low-grade fever, last recorded at 99.6°F three days ago.    A rash initially appeared in his armpits and behind his ears, described as red and blotchy, but is now improving.    He works at [employer] with frequent exposure to various illnesses. He has a history of frequent strep throat as a child and no longer has tonsils. He has a history of COVID-19 infection, which previously presented with gastrointestinal symptoms. Covid test from 7/16/25 was negative.          HISTORY:  Past Medical History[1]   Past Surgical History[2]   Family History[3]   Social History: Short Social Hx on File[4]     Medications (Active prior to today's visit):  Current Medications[5]    Allergies:  Allergies[6]    PSFH elements reviewed from today and agreed except as otherwise stated in HPI.  ROS:     Review of Systems      Pertinent positives and negatives noted in the the HPI.    PHYSICAL EXAM:   /80 (BP Location: Left arm, Patient Position: Sitting, Cuff Size: large)   Pulse 93   Temp 97.3 °F (36.3 °C) (Temporal)   Resp 18   Ht 5' 3\" (1.6 m)   Wt 220 lb 9.6 oz (100.1 kg)   SpO2 98%   BMI 39.08 kg/m²   Vital signs reviewed.Appears stated age, well groomed.  Physical Exam  Vitals reviewed.   Constitutional:       General: He is not in acute distress.     Appearance: Normal appearance. He is not toxic-appearing.   HENT:      Head: Normocephalic.      Right Ear: Tympanic membrane, ear canal and external ear  normal. There is no impacted cerumen.      Left Ear: Ear canal and external ear normal.      Nose: Nose normal. No congestion or rhinorrhea.      Mouth/Throat:      Mouth: Mucous membranes are moist.      Pharynx: Oropharynx is clear. Posterior oropharyngeal erythema present. No oropharyngeal exudate.   Cardiovascular:      Rate and Rhythm: Normal rate and regular rhythm.      Pulses: Normal pulses.      Heart sounds: Normal heart sounds.   Pulmonary:      Effort: Pulmonary effort is normal.      Breath sounds: Normal breath sounds.   Abdominal:      General: Abdomen is flat. Bowel sounds are normal. There is no distension.      Palpations: Abdomen is soft.      Tenderness: There is abdominal tenderness.      Comments: Tender along LUQ, but no splenomegaly on palpation   Skin:     General: Skin is warm and dry.   Neurological:      Mental Status: He is alert and oriented to person, place, and time.   Psychiatric:         Behavior: Behavior normal.          LABS     No visits with results within 2 Month(s) from this visit.   Latest known visit with results is:   Admission on 03/31/2025, Discharged on 03/31/2025   Component Date Value    POCT INFLUENZA A 03/31/2025 Negative     POCT INFLUENZA B 03/31/2025 Negative     Rapid SARS-CoV-2 by PCR 03/31/2025 Not Detected       REVIEWED THIS VISIT  ASSESSMENT/PLAN:   29 year old adult with    1.Infectious Mononucleosis  - suspect viral illness, had Covid neg test on 7/16; will r/o Strep  - Rapid Strep:neg  - Rapid Monospot: positive  - supportive care d/w pt    2. Acute pharyngitis, unspecified etiology      - Rapid Mono test  - Rapid Strep    2. Fever, unspecified fever cause  See above.    - Rapid Mono test  - Rapid Strep      Meds This Visit:  Requested Prescriptions      No prescriptions requested or ordered in this encounter       Health Maintenance:  Health Maintenance   Topic Date Due    Annual Physical  Never done    Pneumococcal Vaccine: Birth to 50yrs (1 of 2 -  PCV) Never done    COVID-19 Vaccine (4 - 2024-25 season) 09/01/2024    Influenza Vaccine (1) 10/01/2025    DTaP,Tdap,and Td Vaccines (2 - Td or Tdap) 05/31/2033    Annual Depression Screening  Completed    Tobacco Cessation Counseling  Completed    Meningococcal B Vaccine  Aged Out         Patient/Caregiver Education: There are no barriers to learning. Medical education done.   Outcome: Patient verbalizes understanding and agrees with plan. Advised to call or RTC if symptoms persist or worsen.    Problem List:   Problem List[7]    Imaging & Referrals:  None     7/17/2025  Joanne Fang MD      Patient understands plan and follow-up.  No follow-ups on file.              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    Obesity    Sphincter of Oddi dysfunction   [2]   Past Surgical History:  Procedure Laterality Date    Adenoidectomy      Same time as Tonsils    Bile duct endoscopy,intraoperative      had a stent put in at age 20    Cholecystectomy  2009    Colonoscopy  2012    Hc implant ear tubes      age 4    Hysterectomy  2018    complete hysterectomy    Laparoscopic cholecystectomy      age 14    Mastectomy      d/t transgender, age 21    Other surgical history  2016    Double mastectomy w/ nipple grafts    Removal adenoids,primary,12+ y/o      age 10    Tonsillectomy      age 10   [3]   Family History  Problem Relation Age of Onset    Schizophrenia Father     Depression Father     Obesity Father     Psychiatric Father     ADHD Mother     Anxiety Mother     Depression Mother     Hypertension Mother     Diabetes Mother     Obesity Mother     Psychiatric Mother     Stroke Mother     Anxiety Maternal Grandmother     Heart Disorder Maternal Grandmother     Hypertension Maternal Grandmother     Psychiatric Maternal Grandmother     Heart Disorder Maternal  Grandfather     Cancer Maternal Grandfather     Hypertension Maternal Grandfather     Anxiety Sister     Schizophrenia Sister     Asthma Sister     Depression Sister     Obesity Sister     Psychiatric Sister     PTSD Sister     Anxiety Sister     Depression Sister     Psychiatric Sister     Depression Sister     Anxiety Sister     OCD Sister     Anemia Sister     Obesity Sister     Psychiatric Sister     Heart Disorder Paternal Grandfather     Psychiatric Paternal Grandmother     Psychiatric Brother    [4]   Social History  Socioeconomic History    Marital status: Single   Tobacco Use    Smoking status: Every Day     Current packs/day: 0.50     Average packs/day: 0.5 packs/day for 14.0 years (7.0 ttl pk-yrs)     Types: Cigarettes     Passive exposure: Never    Smokeless tobacco: Never    Tobacco comments:     I am trying to quit, was able to cut down to half a pack   Vaping Use    Vaping status: Never Used   Substance and Sexual Activity    Alcohol use: Yes     Alcohol/week: 2.0 - 4.0 standard drinks of alcohol     Types: 1 - 3 Glasses of wine, 1 Shots of liquor per week     Comment: It is very sporadic, I go months without touching it    Drug use: Yes     Types: Cannabis     Comment: None    Sexual activity: Yes     Partners: Female   Other Topics Concern    Caffeine Concern No    Exercise No    Seat Belt No    Special Diet Yes     Comment: I am having weird reactions to food that I didn't before    Stress Concern Yes     Comment: A little bit when school is in session    Weight Concern Yes     Comment: I gained a significant amount of weight from previous meds     Social Drivers of Health     Food Insecurity: No Food Insecurity (1/29/2025)    NCSS - Food Insecurity     Worried About Running Out of Food in the Last Year: No     Ran Out of Food in the Last Year: No   Transportation Needs: No Transportation Needs (1/29/2025)    NCSS - Transportation     Lack of Transportation: No   Housing Stability: At Risk  (1/29/2025)    NCSS - Housing/Utilities     Has Housing: Yes     Worried About Losing Housing: No     Unable to Get Utilities: Yes   [5]   Current Outpatient Medications   Medication Sig Dispense Refill    BD DISP NEEDLES 22G X 1-1/2\" Does not apply Misc USE TO INJECT TESTOSTERONE INTO THE SKIN ONCE A WEEK      DULoxetine (CYMBALTA) 60 MG Oral Cap DR Particles Take 1 capsule (60 mg total) by mouth daily. 30 capsule 0    gabapentin 400 MG Oral Cap Take 1 capsule (400 mg total) by mouth 3 (three) times daily. 90 capsule 0    lamoTRIgine (LAMICTAL) 150 MG Oral Tab Take 1 tablet (150 mg total) by mouth 2 (two) times daily. 60 tablet 1    cyclobenzaprine 5 MG Oral Tab Take 1 tablet (5 mg total) by mouth 3 (three) times daily as needed for Muscle spasms. 15 tablet 0    methylPREDNISolone (MEDROL) 4 MG Oral Tablet Therapy Pack As directed. 1 each 0    Syringe/Needle, Disp, 22G X 1-1/2\" 1.5 ML Does not apply Misc Use to inject testosterone into the skin once weekly. 12 each 1    Syringe 18G X 1-1/2\" 3 ML Does not apply Misc Draw up 0.3mL and change needle to 22G to inject IM testosterone every week 6 each 1    testosterone cypionate 200 mg/mL Intramuscular Solution INJECT 0.4ML INTO THE MUSCLE ONCE WEEKLY (Patient not taking: Reported on 4/10/2025)      EPINEPHrine (EPIPEN 2-CURTIS) 0.3 MG/0.3ML Injection Solution Auto-injector Inject 0.3 mL (1 each total) into the muscle.      Testosterone Cypionate & Prop 200-20 MG/ML Intramuscular Solution Inject 0.3 mg into the muscle once a week. Pt takes on Tuesday's  (Patient not taking: Reported on 4/10/2025)     [6]   Allergies  Allergen Reactions    Gluten Meal HIVES, DIARRHEA and NAUSEA ONLY    Wasp Venom Protein SHORTNESS OF BREATH    Bee Venom RASH   [7]   Patient Active Problem List  Diagnosis    Bipolar disorder (HCC)    Anxiety disorder, unspecified    Anorexia nervosa, restricting type, severe    Sphincter of Oddi dysfunction    Female-to-male transgender person    Allergic  rhinitis    Endometriosis    Esophageal reflux    Migraine    Protein deficiency disease (HCC)    Drug overdose    COVID-19 virus infection    Chronic back pain

## 2025-08-25 ENCOUNTER — OFFICE VISIT (OUTPATIENT)
Dept: FAMILY MEDICINE CLINIC | Facility: CLINIC | Age: 30
End: 2025-08-25

## 2025-08-25 VITALS
RESPIRATION RATE: 18 BRPM | BODY MASS INDEX: 40.08 KG/M2 | SYSTOLIC BLOOD PRESSURE: 124 MMHG | TEMPERATURE: 98 F | OXYGEN SATURATION: 99 % | HEART RATE: 108 BPM | DIASTOLIC BLOOD PRESSURE: 82 MMHG | WEIGHT: 226.19 LBS | HEIGHT: 63 IN

## 2025-08-25 DIAGNOSIS — Z13.228 SCREENING FOR ENDOCRINE, NUTRITIONAL, METABOLIC AND IMMUNITY DISORDER: ICD-10-CM

## 2025-08-25 DIAGNOSIS — Z13.21 SCREENING FOR ENDOCRINE, NUTRITIONAL, METABOLIC AND IMMUNITY DISORDER: ICD-10-CM

## 2025-08-25 DIAGNOSIS — Z13.0 SCREENING FOR ENDOCRINE, NUTRITIONAL, METABOLIC AND IMMUNITY DISORDER: ICD-10-CM

## 2025-08-25 DIAGNOSIS — R73.01 ELEVATED FASTING GLUCOSE: ICD-10-CM

## 2025-08-25 DIAGNOSIS — R63.1 POLYDIPSIA: ICD-10-CM

## 2025-08-25 DIAGNOSIS — Z13.29 SCREENING FOR ENDOCRINE, NUTRITIONAL, METABOLIC AND IMMUNITY DISORDER: ICD-10-CM

## 2025-08-25 DIAGNOSIS — Z13.6 SCREENING FOR ISCHEMIC HEART DISEASE: ICD-10-CM

## 2025-08-25 DIAGNOSIS — R63.5 WEIGHT GAIN: ICD-10-CM

## 2025-08-25 DIAGNOSIS — Z00.00 ANNUAL PHYSICAL EXAM: Primary | ICD-10-CM

## 2025-08-25 PROCEDURE — 99395 PREV VISIT EST AGE 18-39: CPT | Performed by: FAMILY MEDICINE

## 2025-08-27 DIAGNOSIS — Z78.9 FEMALE-TO-MALE TRANSGENDER PERSON: Primary | ICD-10-CM

## 2025-08-27 RX ORDER — NEEDLES, DISPOSABLE 25GX5/8"
NEEDLE, DISPOSABLE MISCELLANEOUS
Qty: 12 EACH | Refills: 1 | Status: SHIPPED | OUTPATIENT
Start: 2025-08-27

## (undated) NOTE — ED AVS SNAPSHOT
Grishilda Bashir   MRN: [id number]    Department:  [facility] Emergency Department   Date of Visit:  3/20/2020           Disclosure     Insurance plans vary and the physician(s) referred by the ER may not be covered by your plan.  Please co tell this physician (or your personal doctor if your instructions are to return to your personal doctor) about any new or lasting problems. The primary care or specialist physician will see patients referred from the [facility] Emergency Department.  Dalton Howard